# Patient Record
Sex: FEMALE | Race: BLACK OR AFRICAN AMERICAN | Employment: UNEMPLOYED | URBAN - METROPOLITAN AREA
[De-identification: names, ages, dates, MRNs, and addresses within clinical notes are randomized per-mention and may not be internally consistent; named-entity substitution may affect disease eponyms.]

---

## 2019-11-21 ENCOUNTER — HOSPITAL ENCOUNTER (EMERGENCY)
Age: 59
Discharge: HOME | End: 2019-11-21
Payer: COMMERCIAL

## 2019-11-21 VITALS
RESPIRATION RATE: 16 BRPM | DIASTOLIC BLOOD PRESSURE: 90 MMHG | SYSTOLIC BLOOD PRESSURE: 163 MMHG | TEMPERATURE: 98.4 F | HEIGHT: 65 IN | WEIGHT: 216.93 LBS | OXYGEN SATURATION: 99 % | HEART RATE: 91 BPM | BODY MASS INDEX: 36.14 KG/M2

## 2019-11-21 DIAGNOSIS — L29.9 EAR ITCHING: Primary | ICD-10-CM

## 2019-11-21 DIAGNOSIS — H61.21 EXCESSIVE CERUMEN IN EAR CANAL, RIGHT: ICD-10-CM

## 2019-11-21 PROCEDURE — 99282 EMERGENCY DEPT VISIT SF MDM: CPT

## 2019-11-22 ASSESSMENT — ENCOUNTER SYMPTOMS: RESPIRATORY NEGATIVE: 1

## 2020-06-23 ENCOUNTER — HOSPITAL ENCOUNTER (INPATIENT)
Age: 60
LOS: 2 days | Discharge: HOME | DRG: 174 | End: 2020-06-25
Attending: STUDENT IN AN ORGANIZED HEALTH CARE EDUCATION/TRAINING PROGRAM | Admitting: INTERNAL MEDICINE
Payer: COMMERCIAL

## 2020-06-23 ENCOUNTER — APPOINTMENT (OUTPATIENT)
Dept: GENERAL RADIOLOGY | Age: 60
DRG: 174 | End: 2020-06-23
Payer: COMMERCIAL

## 2020-06-23 PROBLEM — I21.29 ACUTE ST ELEVATION MYOCARDIAL INFARCTION (STEMI) OF POSTERIOR WALL (HCC): Status: ACTIVE | Noted: 2020-06-23

## 2020-06-23 LAB
A/G RATIO: 1.5 (ref 1.1–2.2)
ALBUMIN SERPL-MCNC: 4.3 G/DL (ref 3.4–5)
ALP BLD-CCNC: 100 U/L (ref 40–129)
ALT SERPL-CCNC: 16 U/L (ref 10–40)
ANION GAP SERPL CALCULATED.3IONS-SCNC: 16 MMOL/L (ref 3–16)
APTT: 34.5 SEC (ref 24.2–36.2)
AST SERPL-CCNC: 13 U/L (ref 15–37)
BASOPHILS ABSOLUTE: 0.1 K/UL (ref 0–0.2)
BASOPHILS RELATIVE PERCENT: 0.6 %
BILIRUB SERPL-MCNC: <0.2 MG/DL (ref 0–1)
BUN BLDV-MCNC: 12 MG/DL (ref 7–20)
CALCIUM SERPL-MCNC: 9.1 MG/DL (ref 8.3–10.6)
CHLORIDE BLD-SCNC: 100 MMOL/L (ref 99–110)
CO2: 24 MMOL/L (ref 21–32)
CREAT SERPL-MCNC: 0.6 MG/DL (ref 0.6–1.2)
D DIMER: <200 NG/ML DDU (ref 0–229)
EOSINOPHILS ABSOLUTE: 0.1 K/UL (ref 0–0.6)
EOSINOPHILS RELATIVE PERCENT: 0.7 %
GFR AFRICAN AMERICAN: >60
GFR NON-AFRICAN AMERICAN: >60
GLOBULIN: 2.9 G/DL
GLUCOSE BLD-MCNC: 155 MG/DL (ref 70–99)
HCT VFR BLD CALC: 43.6 % (ref 36–48)
HEMOGLOBIN: 14.5 G/DL (ref 12–16)
INR BLD: 0.94 (ref 0.86–1.14)
LACTIC ACID: 2.4 MMOL/L (ref 0.4–2)
LYMPHOCYTES ABSOLUTE: 3.3 K/UL (ref 1–5.1)
LYMPHOCYTES RELATIVE PERCENT: 26.9 %
MCH RBC QN AUTO: 28.9 PG (ref 26–34)
MCHC RBC AUTO-ENTMCNC: 33.2 G/DL (ref 31–36)
MCV RBC AUTO: 87.1 FL (ref 80–100)
MONOCYTES ABSOLUTE: 0.5 K/UL (ref 0–1.3)
MONOCYTES RELATIVE PERCENT: 4.1 %
NEUTROPHILS ABSOLUTE: 8.2 K/UL (ref 1.7–7.7)
NEUTROPHILS RELATIVE PERCENT: 67.7 %
PDW BLD-RTO: 14.3 % (ref 12.4–15.4)
PLATELET # BLD: 236 K/UL (ref 135–450)
PMV BLD AUTO: 8.2 FL (ref 5–10.5)
POTASSIUM REFLEX MAGNESIUM: 3.6 MMOL/L (ref 3.5–5.1)
PROCALCITONIN: 0.04 NG/ML (ref 0–0.15)
PROTHROMBIN TIME: 10.9 SEC (ref 10–13.2)
RBC # BLD: 5.01 M/UL (ref 4–5.2)
SARS-COV-2, NAAT: NOT DETECTED
SODIUM BLD-SCNC: 140 MMOL/L (ref 136–145)
TOTAL PROTEIN: 7.2 G/DL (ref 6.4–8.2)
TROPONIN: 0.05 NG/ML
TROPONIN: 0.1 NG/ML
WBC # BLD: 12.1 K/UL (ref 4–11)

## 2020-06-23 PROCEDURE — 71045 X-RAY EXAM CHEST 1 VIEW: CPT

## 2020-06-23 PROCEDURE — B2111ZZ FLUOROSCOPY OF MULTIPLE CORONARY ARTERIES USING LOW OSMOLAR CONTRAST: ICD-10-PCS | Performed by: INTERNAL MEDICINE

## 2020-06-23 PROCEDURE — 85730 THROMBOPLASTIN TIME PARTIAL: CPT

## 2020-06-23 PROCEDURE — 83605 ASSAY OF LACTIC ACID: CPT

## 2020-06-23 PROCEDURE — 6370000000 HC RX 637 (ALT 250 FOR IP): Performed by: INTERNAL MEDICINE

## 2020-06-23 PROCEDURE — 85610 PROTHROMBIN TIME: CPT

## 2020-06-23 PROCEDURE — 6370000000 HC RX 637 (ALT 250 FOR IP)

## 2020-06-23 PROCEDURE — U0002 COVID-19 LAB TEST NON-CDC: HCPCS

## 2020-06-23 PROCEDURE — B2151ZZ FLUOROSCOPY OF LEFT HEART USING LOW OSMOLAR CONTRAST: ICD-10-PCS | Performed by: INTERNAL MEDICINE

## 2020-06-23 PROCEDURE — 6370000000 HC RX 637 (ALT 250 FOR IP): Performed by: NURSE PRACTITIONER

## 2020-06-23 PROCEDURE — 2100000000 HC CCU R&B

## 2020-06-23 PROCEDURE — 85379 FIBRIN DEGRADATION QUANT: CPT

## 2020-06-23 PROCEDURE — 2580000003 HC RX 258: Performed by: INTERNAL MEDICINE

## 2020-06-23 PROCEDURE — 36415 COLL VENOUS BLD VENIPUNCTURE: CPT

## 2020-06-23 PROCEDURE — 93005 ELECTROCARDIOGRAM TRACING: CPT | Performed by: NURSE PRACTITIONER

## 2020-06-23 PROCEDURE — 027035Z DILATION OF CORONARY ARTERY, ONE ARTERY WITH TWO DRUG-ELUTING INTRALUMINAL DEVICES, PERCUTANEOUS APPROACH: ICD-10-PCS | Performed by: INTERNAL MEDICINE

## 2020-06-23 PROCEDURE — 99285 EMERGENCY DEPT VISIT HI MDM: CPT

## 2020-06-23 PROCEDURE — 93005 ELECTROCARDIOGRAM TRACING: CPT | Performed by: STUDENT IN AN ORGANIZED HEALTH CARE EDUCATION/TRAINING PROGRAM

## 2020-06-23 PROCEDURE — 96374 THER/PROPH/DIAG INJ IV PUSH: CPT

## 2020-06-23 PROCEDURE — 6360000002 HC RX W HCPCS: Performed by: INTERNAL MEDICINE

## 2020-06-23 PROCEDURE — 85025 COMPLETE CBC W/AUTO DIFF WBC: CPT

## 2020-06-23 PROCEDURE — 84145 PROCALCITONIN (PCT): CPT

## 2020-06-23 PROCEDURE — 4A023N7 MEASUREMENT OF CARDIAC SAMPLING AND PRESSURE, LEFT HEART, PERCUTANEOUS APPROACH: ICD-10-PCS | Performed by: INTERNAL MEDICINE

## 2020-06-23 PROCEDURE — 6370000000 HC RX 637 (ALT 250 FOR IP): Performed by: STUDENT IN AN ORGANIZED HEALTH CARE EDUCATION/TRAINING PROGRAM

## 2020-06-23 PROCEDURE — 80053 COMPREHEN METABOLIC PANEL: CPT

## 2020-06-23 PROCEDURE — 84484 ASSAY OF TROPONIN QUANT: CPT

## 2020-06-23 PROCEDURE — 6360000002 HC RX W HCPCS: Performed by: NURSE PRACTITIONER

## 2020-06-23 RX ORDER — ASPIRIN 81 MG/1
81 TABLET, CHEWABLE ORAL DAILY
Status: DISCONTINUED | OUTPATIENT
Start: 2020-06-24 | End: 2020-06-25 | Stop reason: HOSPADM

## 2020-06-23 RX ORDER — HEPARIN SODIUM 1000 [USP'U]/ML
30 INJECTION, SOLUTION INTRAVENOUS; SUBCUTANEOUS PRN
Status: DISCONTINUED | OUTPATIENT
Start: 2020-06-23 | End: 2020-06-23 | Stop reason: SDUPTHER

## 2020-06-23 RX ORDER — HEPARIN SODIUM 1000 [USP'U]/ML
60 INJECTION, SOLUTION INTRAVENOUS; SUBCUTANEOUS PRN
Status: DISCONTINUED | OUTPATIENT
Start: 2020-06-23 | End: 2020-06-23 | Stop reason: SDUPTHER

## 2020-06-23 RX ORDER — ASPIRIN 81 MG/1
81 TABLET, CHEWABLE ORAL DAILY
COMMUNITY

## 2020-06-23 RX ORDER — HEPARIN SODIUM 1000 [USP'U]/ML
4000 INJECTION, SOLUTION INTRAVENOUS; SUBCUTANEOUS ONCE
Status: COMPLETED | OUTPATIENT
Start: 2020-06-23 | End: 2020-06-23

## 2020-06-23 RX ORDER — ONDANSETRON 2 MG/ML
4 INJECTION INTRAMUSCULAR; INTRAVENOUS EVERY 6 HOURS PRN
Status: DISCONTINUED | OUTPATIENT
Start: 2020-06-23 | End: 2020-06-25 | Stop reason: HOSPADM

## 2020-06-23 RX ORDER — NITROGLYCERIN 0.4 MG/1
TABLET SUBLINGUAL
Status: COMPLETED
Start: 2020-06-23 | End: 2020-06-23

## 2020-06-23 RX ORDER — ATORVASTATIN CALCIUM 20 MG/1
20 TABLET, FILM COATED ORAL DAILY
COMMUNITY
End: 2020-07-07 | Stop reason: CLARIF

## 2020-06-23 RX ORDER — SODIUM CHLORIDE 0.9 % (FLUSH) 0.9 %
10 SYRINGE (ML) INJECTION EVERY 12 HOURS SCHEDULED
Status: DISCONTINUED | OUTPATIENT
Start: 2020-06-23 | End: 2020-06-24 | Stop reason: SDUPTHER

## 2020-06-23 RX ORDER — ASPIRIN 81 MG/1
324 TABLET, CHEWABLE ORAL ONCE
Status: DISCONTINUED | OUTPATIENT
Start: 2020-06-23 | End: 2020-06-23

## 2020-06-23 RX ORDER — ASCORBIC ACID 500 MG
500 TABLET ORAL DAILY
COMMUNITY
End: 2021-12-08

## 2020-06-23 RX ORDER — NICOTINE 21 MG/24HR
1 PATCH, TRANSDERMAL 24 HOURS TRANSDERMAL DAILY
Status: DISCONTINUED | OUTPATIENT
Start: 2020-06-24 | End: 2020-06-25 | Stop reason: HOSPADM

## 2020-06-23 RX ORDER — HEPARIN SODIUM 10000 [USP'U]/100ML
11.8 INJECTION, SOLUTION INTRAVENOUS CONTINUOUS
Status: DISCONTINUED | OUTPATIENT
Start: 2020-06-23 | End: 2020-06-24

## 2020-06-23 RX ORDER — ASPIRIN 81 MG/1
324 TABLET, CHEWABLE ORAL ONCE
Status: COMPLETED | OUTPATIENT
Start: 2020-06-23 | End: 2020-06-23

## 2020-06-23 RX ORDER — ACETAMINOPHEN 650 MG/1
650 SUPPOSITORY RECTAL EVERY 6 HOURS PRN
Status: DISCONTINUED | OUTPATIENT
Start: 2020-06-23 | End: 2020-06-24 | Stop reason: SDUPTHER

## 2020-06-23 RX ORDER — POTASSIUM CHLORIDE 20 MEQ/1
40 TABLET, EXTENDED RELEASE ORAL PRN
Status: DISCONTINUED | OUTPATIENT
Start: 2020-06-23 | End: 2020-06-25 | Stop reason: HOSPADM

## 2020-06-23 RX ORDER — POTASSIUM CHLORIDE 7.45 MG/ML
10 INJECTION INTRAVENOUS PRN
Status: DISCONTINUED | OUTPATIENT
Start: 2020-06-23 | End: 2020-06-25 | Stop reason: HOSPADM

## 2020-06-23 RX ORDER — ATORVASTATIN CALCIUM 40 MG/1
40 TABLET, FILM COATED ORAL NIGHTLY
Status: DISCONTINUED | OUTPATIENT
Start: 2020-06-23 | End: 2020-06-25 | Stop reason: HOSPADM

## 2020-06-23 RX ORDER — NITROGLYCERIN 0.4 MG/1
0.4 TABLET SUBLINGUAL EVERY 5 MIN PRN
Status: COMPLETED | OUTPATIENT
Start: 2020-06-23 | End: 2020-06-23

## 2020-06-23 RX ORDER — LOSARTAN POTASSIUM 100 MG/1
100 TABLET ORAL DAILY
COMMUNITY

## 2020-06-23 RX ORDER — M-VIT,TX,IRON,MINS/CALC/FOLIC 27MG-0.4MG
1 TABLET ORAL DAILY
COMMUNITY

## 2020-06-23 RX ORDER — FAMOTIDINE 20 MG/1
20 TABLET, FILM COATED ORAL 2 TIMES DAILY
Status: DISCONTINUED | OUTPATIENT
Start: 2020-06-23 | End: 2020-06-25 | Stop reason: HOSPADM

## 2020-06-23 RX ORDER — SODIUM CHLORIDE 0.9 % (FLUSH) 0.9 %
10 SYRINGE (ML) INJECTION PRN
Status: DISCONTINUED | OUTPATIENT
Start: 2020-06-23 | End: 2020-06-24 | Stop reason: SDUPTHER

## 2020-06-23 RX ORDER — ACETAMINOPHEN 325 MG/1
650 TABLET ORAL EVERY 6 HOURS PRN
Status: DISCONTINUED | OUTPATIENT
Start: 2020-06-23 | End: 2020-06-24 | Stop reason: SDUPTHER

## 2020-06-23 RX ORDER — POLYETHYLENE GLYCOL 3350 17 G/17G
17 POWDER, FOR SOLUTION ORAL DAILY PRN
Status: DISCONTINUED | OUTPATIENT
Start: 2020-06-23 | End: 2020-06-25 | Stop reason: HOSPADM

## 2020-06-23 RX ORDER — PROMETHAZINE HYDROCHLORIDE 25 MG/1
12.5 TABLET ORAL EVERY 6 HOURS PRN
Status: DISCONTINUED | OUTPATIENT
Start: 2020-06-23 | End: 2020-06-25 | Stop reason: HOSPADM

## 2020-06-23 RX ORDER — MAGNESIUM SULFATE IN WATER 40 MG/ML
2 INJECTION, SOLUTION INTRAVENOUS PRN
Status: DISCONTINUED | OUTPATIENT
Start: 2020-06-23 | End: 2020-06-25 | Stop reason: HOSPADM

## 2020-06-23 RX ORDER — NITROGLYCERIN 0.4 MG/1
0.4 TABLET SUBLINGUAL EVERY 5 MIN PRN
Status: DISCONTINUED | OUTPATIENT
Start: 2020-06-23 | End: 2020-06-23 | Stop reason: SDUPTHER

## 2020-06-23 RX ADMIN — NITROGLYCERIN 0.4 MG: 0.4 TABLET, ORALLY DISINTEGRATING SUBLINGUAL at 19:39

## 2020-06-23 RX ADMIN — NITROGLYCERIN 0.4 MG: 0.4 TABLET, ORALLY DISINTEGRATING SUBLINGUAL at 23:12

## 2020-06-23 RX ADMIN — HEPARIN SODIUM 4000 UNITS: 1000 INJECTION, SOLUTION INTRAVENOUS; SUBCUTANEOUS at 19:48

## 2020-06-23 RX ADMIN — Medication 10 ML: at 23:30

## 2020-06-23 RX ADMIN — HEPARIN SODIUM 10 ML/HR: 10000 INJECTION, SOLUTION INTRAVENOUS at 19:49

## 2020-06-23 RX ADMIN — Medication 10 ML: at 22:33

## 2020-06-23 RX ADMIN — ONDANSETRON 4 MG: 2 INJECTION INTRAMUSCULAR; INTRAVENOUS at 23:30

## 2020-06-23 RX ADMIN — ATORVASTATIN CALCIUM 40 MG: 40 TABLET, FILM COATED ORAL at 22:33

## 2020-06-23 RX ADMIN — NITROGLYCERIN 0.4 MG: 0.4 TABLET, ORALLY DISINTEGRATING SUBLINGUAL at 22:27

## 2020-06-23 RX ADMIN — NITROGLYCERIN 0.4 MG: 0.4 TABLET, ORALLY DISINTEGRATING SUBLINGUAL at 19:34

## 2020-06-23 RX ADMIN — NITROGLYCERIN 0.4 MG: 0.4 TABLET, ORALLY DISINTEGRATING SUBLINGUAL at 20:26

## 2020-06-23 RX ADMIN — FAMOTIDINE 20 MG: 20 TABLET ORAL at 22:33

## 2020-06-23 RX ADMIN — TICAGRELOR 180 MG: 90 TABLET ORAL at 20:21

## 2020-06-23 RX ADMIN — ASPIRIN 81 MG 324 MG: 81 TABLET ORAL at 19:33

## 2020-06-23 RX ADMIN — METOPROLOL TARTRATE 25 MG: 25 TABLET, FILM COATED ORAL at 20:21

## 2020-06-23 RX ADMIN — NITROGLYCERIN 0.4 MG: 0.4 TABLET, ORALLY DISINTEGRATING SUBLINGUAL at 21:33

## 2020-06-23 ASSESSMENT — PAIN SCALES - GENERAL
PAINLEVEL_OUTOF10: 0
PAINLEVEL_OUTOF10: 3
PAINLEVEL_OUTOF10: 5
PAINLEVEL_OUTOF10: 0
PAINLEVEL_OUTOF10: 1
PAINLEVEL_OUTOF10: 5
PAINLEVEL_OUTOF10: 0
PAINLEVEL_OUTOF10: 5
PAINLEVEL_OUTOF10: 10

## 2020-06-23 ASSESSMENT — ENCOUNTER SYMPTOMS
DIARRHEA: 0
SINUS PAIN: 0
TROUBLE SWALLOWING: 0
SINUS PRESSURE: 0
VOICE CHANGE: 0
FACIAL SWELLING: 0
RHINORRHEA: 0
SORE THROAT: 0
SHORTNESS OF BREATH: 1
ABDOMINAL DISTENTION: 0
NAUSEA: 0
ABDOMINAL PAIN: 0
CONSTIPATION: 0
COLOR CHANGE: 0
VOMITING: 0
COUGH: 1

## 2020-06-23 ASSESSMENT — PAIN DESCRIPTION - PAIN TYPE
TYPE: ACUTE PAIN
TYPE: CHRONIC PAIN
TYPE: ACUTE PAIN

## 2020-06-23 ASSESSMENT — PAIN DESCRIPTION - PROGRESSION: CLINICAL_PROGRESSION: NOT CHANGED

## 2020-06-23 ASSESSMENT — PAIN - FUNCTIONAL ASSESSMENT
PAIN_FUNCTIONAL_ASSESSMENT: PREVENTS OR INTERFERES SOME ACTIVE ACTIVITIES AND ADLS

## 2020-06-23 ASSESSMENT — PAIN DESCRIPTION - FREQUENCY
FREQUENCY: INTERMITTENT
FREQUENCY: INTERMITTENT

## 2020-06-23 ASSESSMENT — PAIN DESCRIPTION - LOCATION
LOCATION: CHEST
LOCATION: CHEST;BACK
LOCATION: CHEST

## 2020-06-23 ASSESSMENT — PAIN DESCRIPTION - ORIENTATION
ORIENTATION: LEFT

## 2020-06-23 ASSESSMENT — PAIN DESCRIPTION - DESCRIPTORS
DESCRIPTORS: PRESSURE
DESCRIPTORS: ACHING;PRESSURE
DESCRIPTORS: ACHING;PRESSURE
DESCRIPTORS: ACHING
DESCRIPTORS: TIGHTNESS;OTHER (COMMENT)

## 2020-06-23 ASSESSMENT — PAIN DESCRIPTION - ONSET: ONSET: ON-GOING

## 2020-06-23 NOTE — ED PROVIDER NOTES
fL    Neutrophils % 67.7 %    Lymphocytes % 26.9 %    Monocytes % 4.1 %    Eosinophils % 0.7 %    Basophils % 0.6 %    Neutrophils Absolute 8.2 (H) 1.7 - 7.7 K/uL    Lymphocytes Absolute 3.3 1.0 - 5.1 K/uL    Monocytes Absolute 0.5 0.0 - 1.3 K/uL    Eosinophils Absolute 0.1 0.0 - 0.6 K/uL    Basophils Absolute 0.1 0.0 - 0.2 K/uL   Comprehensive Metabolic Panel w/ Reflex to MG   Result Value Ref Range    Sodium 140 136 - 145 mmol/L    Potassium reflex Magnesium 3.6 3.5 - 5.1 mmol/L    Chloride 100 99 - 110 mmol/L    CO2 24 21 - 32 mmol/L    Anion Gap 16 3 - 16    Glucose 155 (H) 70 - 99 mg/dL    BUN 12 7 - 20 mg/dL    CREATININE 0.6 0.6 - 1.2 mg/dL    GFR Non-African American >60 >60    GFR African American >60 >60    Calcium 9.1 8.3 - 10.6 mg/dL    Total Protein 7.2 6.4 - 8.2 g/dL    Alb 4.3 3.4 - 5.0 g/dL    Albumin/Globulin Ratio 1.5 1.1 - 2.2    Total Bilirubin <0.2 0.0 - 1.0 mg/dL    Alkaline Phosphatase 100 40 - 129 U/L    ALT 16 10 - 40 U/L    AST 13 (L) 15 - 37 U/L    Globulin 2.9 g/dL   Procalcitonin   Result Value Ref Range    Procalcitonin 0.04 0.00 - 0.15 ng/mL   Troponin   Result Value Ref Range    Troponin 0.05 (H) <0.01 ng/mL   Lactic Acid, Plasma   Result Value Ref Range    Lactic Acid 2.4 (H) 0.4 - 2.0 mmol/L   Protime-INR   Result Value Ref Range    Protime 10.9 10.0 - 13.2 sec    INR 0.94 0.86 - 1.14   APTT   Result Value Ref Range    aPTT 34.5 24.2 - 36.2 sec   COVID-19   Result Value Ref Range    SARS-CoV-2, NAAT Not Detected Not Detected   Troponin   Result Value Ref Range    Troponin 0.10 (H) <0.01 ng/mL     Xr Chest Portable    Result Date: 6/23/2020  EXAMINATION: ONE XRAY VIEW OF THE CHEST 6/23/2020 6:54 pm COMPARISON: September 15, 2014 HISTORY: ORDERING SYSTEM PROVIDED HISTORY: SOB TECHNOLOGIST PROVIDED HISTORY: Reason for exam:->SOB Reason for Exam: Chest Pain (Pt stated having chest x 2 weeks along with back pain, and SOB. ) Acuity: Acute Type of Exam: Initial FINDINGS: The heart is not radiology section of CPT®: ordered and reviewed   Tests in the medicine section of CPT®: ordered and reviewed   Decide to obtain previous medical records or to obtain history from someone other than the patient: no  Obtain history from someone other than the patient: no  Review and summarize past medical records:yes  I looked up the patient in our electronic medical record:yes  Discuss the patient with other providers:yes  Independent visualization of images, tracings, or specimens:yes  Risk of Complications, Morbidity, and/or Mortality:Moderate  Presenting problems: moderate  Management options: moderate     Critical Care Attestation   Total critical care time: 35 minutes minimum   Critical care time does not include separately billable procedures and treating other patients. Critical care was necessary to treat or prevent imminent or life-threatening deterioration of the following conditions: STEMI, patient  treated urgently in the ED with anticoagulation pending cardiology recommendation. Case discussed with consultants. This record is transcribed utilizing voice recognition technology. There are inherent limitations in this technology. In addition, there may be limitations in editing of this report. If there are any discrepancies, please contact me directly.     Genna Stuart MD   6/24/2020         Nsehniitooh Jessica Jacobs MD  06/24/20 [id number]

## 2020-06-23 NOTE — ED PROVIDER NOTES
scleral icterus. Conjunctiva/sclera: Conjunctivae normal.   Neck:      Musculoskeletal: Full passive range of motion without pain and neck supple. No neck rigidity. Vascular: No JVD. Cardiovascular:      Rate and Rhythm: Regular rhythm. Tachycardia present. Heart sounds: No murmur. No friction rub. No gallop. Comments: EKG on arrival at 1833 showed slight ST depression in V3, V4, and V5. Shortly after arrival patient had worsening chest pain, and repeat EKG showed much worse ST depression in V2, V3, and V4, with flipped T waves in aVL. Cardiology was consulted and recommended nitro sublingual, heparin drip, aspirin, metoprolol, and Brilinta. Patient's chest pain resolved after 2 nitro, but she was still complaining of mild chest pressure. Pulmonary:      Effort: Pulmonary effort is normal. No respiratory distress. Breath sounds: Normal breath sounds. Abdominal:      General: There is no distension. Palpations: Abdomen is soft. Abdomen is not rigid. Tenderness: There is no abdominal tenderness. Musculoskeletal: Normal range of motion. Skin:     General: Skin is warm and dry. Capillary Refill: Capillary refill takes less than 2 seconds. Findings: No rash. Neurological:      General: No focal deficit present. Mental Status: She is alert and oriented to person, place, and time. Cranial Nerves: Cranial nerves are intact.    Psychiatric:         Mood and Affect: Mood normal.         MEDICAL DECISION MAKING    Vitals:    Vitals:    06/23/20 1930 06/23/20 1932 06/23/20 1938 06/23/20 2021   BP:  (!) 169/88 (!) 158/78 (!) 151/78   Pulse:  106 108 98   Resp:  27 24    Temp:       TempSrc:       SpO2:   100%    Weight: 195 lb 8.8 oz (88.7 kg)      Height: 5' 5\" (1.651 m)          LABS:  Labs Reviewed   CBC WITH AUTO DIFFERENTIAL - Abnormal; Notable for the following components:       Result Value    WBC 12.1 (*)     Neutrophils Absolute 8.2 (*)     All other components within normal limits    Narrative:     Performed at:  [facility]  [address] [place] [address]   Phone [phone]   COMPREHENSIVE METABOLIC PANEL W/ REFLEX TO MG FOR LOW K - Abnormal; Notable for the following components:    Glucose 155 (*)     AST 13 (*)     All other components within normal limits    Narrative:     Performed at:  [facility]  [address] [place] [address]   Phone [phone]   TROPONIN - Abnormal; Notable for the following components:    Troponin 0.05 (*)     All other components within normal limits    Narrative:     Performed at:  [facility]  [address] [place] [address]   Phone [phone]   LACTIC ACID, PLASMA - Abnormal; Notable for the following components:    Lactic Acid 2.4 (*)     All other components within normal limits    Narrative:     Performed at:  [facility]  [address] [place] [address]   Phone [phone]   D-DIMER, QUANTITATIVE    Narrative:     Performed at:  [facility]  [address] [place] [address]   Phone [phone]   PROCALCITONIN    Narrative:     Performed at:  [facility]  [address] [facility] [place] [address]   Phone [phone]   PROTIME-INR    Narrative:     Performed at:  [facility]  [address] [place] [address]   Phone [phone]   APTT    Narrative:     Performed at:  [facility]  [address] [facility] [place] [address]   Phone [phone]   URINE RT REFLEX TO CULTURE   APTT   COVID-19   COVID-19   COVID-19        Remainder of labs reviewed and werenegative at this time or not returned at the time of this note.     RADIOLOGY:   Non-plain film images such as CT, Ultrasound and MRI are read by the radiologist. NATY Sykes CNP have directly visualized the radiologic plain film image(s) with the below findings:        Interpretation per the Radiologist below, if available at the time of this note:    XR CHEST PORTABLE   Final Result   No acute cardiopulmonary process              No results found. MEDICAL DECISION MAKING / ED COURSE:      CRITICAL CARE NOTE:  There was a high probability of clinically significant life-threatening deterioration of the patient's condition requiring my urgent intervention. Total critical care time was at least 32 minutes. This includes vital sign monitoring, pulse oximetry monitoring, telemetry monitoring, clinical response to the IV medications, reviewing the nursing notes, consultation time, dictation/documentation time, and interpretation of the labwork. This excludes any separately billable procedures performed. PROCEDURES:   Procedures    None    Patient was given:  Medications   nitroGLYCERIN (NITROSTAT) SL tablet 0.4 mg (0.4 mg Sublingual Given 6/23/20 1939)   heparin 25,000 units in dextrose 5% 250 mL infusion (10 mL/hr Intravenous New Bag 6/23/20 1949)   aspirin chewable tablet 324 mg (324 mg Oral Given 6/23/20 1933)   heparin (porcine) injection 4,000 Units (4,000 Units Intravenous Given 6/23/20 1948)   metoprolol tartrate (LOPRESSOR) tablet 25 mg (25 mg Oral Given 6/23/20 2021)   ticagrelor (BRILINTA) tablet 180 mg (180 mg Oral Given 6/23/20 2021)       Differential Diagnosis: Acute Coronary Syndrome, Congestive Heart Failure, Thoracic Dissection, Pericarditis, Pericardial Effusion, Pulmonary Embolism, Pneumonia, Pneumothorax, Ischemic Bowel, Bowel Obstruction, PUD, GERD, Acute Cholecystitis, Pancreatitis, Hepatitis, Colitis, other    Patient presents with chest pain, shortness of breath, fatigue, and body aches. See HPI for full presentation. Physical exam as above.   Well-appearing pleasant 69-year-old female lying in bed

## 2020-06-23 NOTE — ED NOTES
Chest pain was rated 10 prior to nitro admin, after 1st nitro pt rates pain a 7/10, 2nd to be administered.      Jose Juan Schaefer RN  06/23/20 9226

## 2020-06-24 ENCOUNTER — APPOINTMENT (OUTPATIENT)
Dept: CARDIAC CATH/INVASIVE PROCEDURES | Age: 60
DRG: 174 | End: 2020-06-24
Payer: COMMERCIAL

## 2020-06-24 PROBLEM — F17.200 TOBACCO USE DISORDER: Status: ACTIVE | Noted: 2020-06-24

## 2020-06-24 PROBLEM — E11.9 DM (DIABETES MELLITUS) (HCC): Status: ACTIVE | Noted: 2020-06-24

## 2020-06-24 LAB
ANION GAP SERPL CALCULATED.3IONS-SCNC: 16 MMOL/L (ref 3–16)
APTT: 33.1 SEC (ref 24.2–36.2)
APTT: 48.2 SEC (ref 24.2–36.2)
APTT: 61.5 SEC (ref 24.2–36.2)
BILIRUBIN URINE: NEGATIVE
BLOOD, URINE: NEGATIVE
BUN BLDV-MCNC: 8 MG/DL (ref 7–20)
CALCIUM SERPL-MCNC: 8.9 MG/DL (ref 8.3–10.6)
CHLORIDE BLD-SCNC: 99 MMOL/L (ref 99–110)
CHOLESTEROL, TOTAL: 214 MG/DL (ref 0–199)
CLARITY: CLEAR
CO2: 24 MMOL/L (ref 21–32)
COLOR: YELLOW
CREAT SERPL-MCNC: 0.5 MG/DL (ref 0.6–1.2)
EKG ATRIAL RATE: 58 BPM
EKG ATRIAL RATE: 64 BPM
EKG ATRIAL RATE: 96 BPM
EKG ATRIAL RATE: 96 BPM
EKG ATRIAL RATE: 97 BPM
EKG DIAGNOSIS: NORMAL
EKG P AXIS: 38 DEGREES
EKG P AXIS: 40 DEGREES
EKG P AXIS: 44 DEGREES
EKG P AXIS: 50 DEGREES
EKG P AXIS: 50 DEGREES
EKG P-R INTERVAL: 124 MS
EKG P-R INTERVAL: 138 MS
EKG P-R INTERVAL: 152 MS
EKG P-R INTERVAL: 156 MS
EKG P-R INTERVAL: 184 MS
EKG Q-T INTERVAL: 342 MS
EKG Q-T INTERVAL: 342 MS
EKG Q-T INTERVAL: 358 MS
EKG Q-T INTERVAL: 436 MS
EKG Q-T INTERVAL: 456 MS
EKG QRS DURATION: 68 MS
EKG QRS DURATION: 70 MS
EKG QRS DURATION: 70 MS
EKG QRS DURATION: 72 MS
EKG QRS DURATION: 76 MS
EKG QTC CALCULATION (BAZETT): 432 MS
EKG QTC CALCULATION (BAZETT): 434 MS
EKG QTC CALCULATION (BAZETT): 447 MS
EKG QTC CALCULATION (BAZETT): 449 MS
EKG QTC CALCULATION (BAZETT): 452 MS
EKG R AXIS: -1 DEGREES
EKG R AXIS: 14 DEGREES
EKG R AXIS: 2 DEGREES
EKG R AXIS: 3 DEGREES
EKG R AXIS: 9 DEGREES
EKG T AXIS: 12 DEGREES
EKG T AXIS: 33 DEGREES
EKG T AXIS: 66 DEGREES
EKG T AXIS: 84 DEGREES
EKG T AXIS: 96 DEGREES
EKG VENTRICULAR RATE: 58 BPM
EKG VENTRICULAR RATE: 64 BPM
EKG VENTRICULAR RATE: 96 BPM
EKG VENTRICULAR RATE: 96 BPM
EKG VENTRICULAR RATE: 97 BPM
ESTIMATED AVERAGE GLUCOSE: 114 MG/DL
GFR AFRICAN AMERICAN: >60
GFR NON-AFRICAN AMERICAN: >60
GLUCOSE BLD-MCNC: 135 MG/DL (ref 70–99)
GLUCOSE BLD-MCNC: 161 MG/DL (ref 70–99)
GLUCOSE BLD-MCNC: 174 MG/DL (ref 70–99)
GLUCOSE BLD-MCNC: 185 MG/DL (ref 70–99)
GLUCOSE URINE: NEGATIVE MG/DL
HBA1C MFR BLD: 5.6 %
HCT VFR BLD CALC: 44.4 % (ref 36–48)
HDLC SERPL-MCNC: 47 MG/DL (ref 40–60)
HEMOGLOBIN: 14.8 G/DL (ref 12–16)
KETONES, URINE: NEGATIVE MG/DL
LDL CHOLESTEROL CALCULATED: 128 MG/DL
LEUKOCYTE ESTERASE, URINE: NEGATIVE
MAGNESIUM: 1.7 MG/DL (ref 1.8–2.4)
MCH RBC QN AUTO: 29 PG (ref 26–34)
MCHC RBC AUTO-ENTMCNC: 33.3 G/DL (ref 31–36)
MCV RBC AUTO: 86.9 FL (ref 80–100)
MICROSCOPIC EXAMINATION: NORMAL
NITRITE, URINE: NEGATIVE
PDW BLD-RTO: 14.6 % (ref 12.4–15.4)
PERFORMED ON: ABNORMAL
PH UA: 7.5 (ref 5–8)
PLATELET # BLD: 274 K/UL (ref 135–450)
PMV BLD AUTO: 8.3 FL (ref 5–10.5)
POC ACT LR: 241 SEC
POC ACT LR: >400 SEC
POTASSIUM REFLEX MAGNESIUM: 3.5 MMOL/L (ref 3.5–5.1)
PROTEIN UA: NEGATIVE MG/DL
RBC # BLD: 5.11 M/UL (ref 4–5.2)
SODIUM BLD-SCNC: 139 MMOL/L (ref 136–145)
SPECIFIC GRAVITY UA: 1.01 (ref 1–1.03)
TRIGL SERPL-MCNC: 195 MG/DL (ref 0–150)
TROPONIN: 0.18 NG/ML
URINE REFLEX TO CULTURE: NORMAL
URINE TYPE: NORMAL
UROBILINOGEN, URINE: 0.2 E.U./DL
VLDLC SERPL CALC-MCNC: 39 MG/DL
WBC # BLD: 11 K/UL (ref 4–11)

## 2020-06-24 PROCEDURE — 2140000000 HC CCU INTERMEDIATE R&B

## 2020-06-24 PROCEDURE — C1894 INTRO/SHEATH, NON-LASER: HCPCS

## 2020-06-24 PROCEDURE — C1769 GUIDE WIRE: HCPCS

## 2020-06-24 PROCEDURE — 94761 N-INVAS EAR/PLS OXIMETRY MLT: CPT

## 2020-06-24 PROCEDURE — 81003 URINALYSIS AUTO W/O SCOPE: CPT

## 2020-06-24 PROCEDURE — 93458 L HRT ARTERY/VENTRICLE ANGIO: CPT | Performed by: INTERNAL MEDICINE

## 2020-06-24 PROCEDURE — 6370000000 HC RX 637 (ALT 250 FOR IP): Performed by: INTERNAL MEDICINE

## 2020-06-24 PROCEDURE — 6360000004 HC RX CONTRAST MEDICATION: Performed by: FAMILY MEDICINE

## 2020-06-24 PROCEDURE — 93010 ELECTROCARDIOGRAM REPORT: CPT | Performed by: INTERNAL MEDICINE

## 2020-06-24 PROCEDURE — 93005 ELECTROCARDIOGRAM TRACING: CPT | Performed by: INTERNAL MEDICINE

## 2020-06-24 PROCEDURE — 99153 MOD SED SAME PHYS/QHP EA: CPT

## 2020-06-24 PROCEDURE — 6370000000 HC RX 637 (ALT 250 FOR IP): Performed by: NURSE PRACTITIONER

## 2020-06-24 PROCEDURE — 2700000000 HC OXYGEN THERAPY PER DAY

## 2020-06-24 PROCEDURE — 2709999900 HC NON-CHARGEABLE SUPPLY

## 2020-06-24 PROCEDURE — 84484 ASSAY OF TROPONIN QUANT: CPT

## 2020-06-24 PROCEDURE — 85730 THROMBOPLASTIN TIME PARTIAL: CPT

## 2020-06-24 PROCEDURE — C1887 CATHETER, GUIDING: HCPCS

## 2020-06-24 PROCEDURE — 99152 MOD SED SAME PHYS/QHP 5/>YRS: CPT

## 2020-06-24 PROCEDURE — 83036 HEMOGLOBIN GLYCOSYLATED A1C: CPT

## 2020-06-24 PROCEDURE — 85027 COMPLETE CBC AUTOMATED: CPT

## 2020-06-24 PROCEDURE — 80061 LIPID PANEL: CPT

## 2020-06-24 PROCEDURE — 36415 COLL VENOUS BLD VENIPUNCTURE: CPT

## 2020-06-24 PROCEDURE — 92928 PRQ TCAT PLMT NTRAC ST 1 LES: CPT | Performed by: INTERNAL MEDICINE

## 2020-06-24 PROCEDURE — 6360000002 HC RX W HCPCS

## 2020-06-24 PROCEDURE — 85347 COAGULATION TIME ACTIVATED: CPT

## 2020-06-24 PROCEDURE — 93458 L HRT ARTERY/VENTRICLE ANGIO: CPT

## 2020-06-24 PROCEDURE — 2580000003 HC RX 258: Performed by: INTERNAL MEDICINE

## 2020-06-24 PROCEDURE — 80048 BASIC METABOLIC PNL TOTAL CA: CPT

## 2020-06-24 PROCEDURE — 2500000003 HC RX 250 WO HCPCS

## 2020-06-24 PROCEDURE — 6370000000 HC RX 637 (ALT 250 FOR IP): Performed by: FAMILY MEDICINE

## 2020-06-24 PROCEDURE — 92928 PRQ TCAT PLMT NTRAC ST 1 LES: CPT

## 2020-06-24 PROCEDURE — 83735 ASSAY OF MAGNESIUM: CPT

## 2020-06-24 PROCEDURE — C1874 STENT, COATED/COV W/DEL SYS: HCPCS

## 2020-06-24 PROCEDURE — 99255 IP/OBS CONSLTJ NEW/EST HI 80: CPT | Performed by: INTERNAL MEDICINE

## 2020-06-24 PROCEDURE — C1725 CATH, TRANSLUMIN NON-LASER: HCPCS

## 2020-06-24 RX ORDER — TRAZODONE HYDROCHLORIDE 50 MG/1
50 TABLET ORAL NIGHTLY
Status: DISCONTINUED | OUTPATIENT
Start: 2020-06-24 | End: 2020-06-25 | Stop reason: HOSPADM

## 2020-06-24 RX ORDER — AMITRIPTYLINE HYDROCHLORIDE 10 MG/1
10 TABLET, FILM COATED ORAL NIGHTLY
Status: DISCONTINUED | OUTPATIENT
Start: 2020-06-24 | End: 2020-06-24

## 2020-06-24 RX ORDER — ONDANSETRON 2 MG/ML
4 INJECTION INTRAMUSCULAR; INTRAVENOUS EVERY 6 HOURS PRN
Status: DISCONTINUED | OUTPATIENT
Start: 2020-06-24 | End: 2020-06-24 | Stop reason: SDUPTHER

## 2020-06-24 RX ORDER — ATROPINE SULFATE 0.4 MG/ML
0.5 AMPUL (ML) INJECTION
Status: ACTIVE | OUTPATIENT
Start: 2020-06-24 | End: 2020-06-24

## 2020-06-24 RX ORDER — INSULIN GLARGINE 100 [IU]/ML
15 INJECTION, SOLUTION SUBCUTANEOUS ONCE
Status: DISCONTINUED | OUTPATIENT
Start: 2020-06-24 | End: 2020-06-24

## 2020-06-24 RX ORDER — DEXTROSE MONOHYDRATE 25 G/50ML
12.5 INJECTION, SOLUTION INTRAVENOUS PRN
Status: DISCONTINUED | OUTPATIENT
Start: 2020-06-24 | End: 2020-06-25 | Stop reason: HOSPADM

## 2020-06-24 RX ORDER — SODIUM CHLORIDE 0.9 % (FLUSH) 0.9 %
10 SYRINGE (ML) INJECTION PRN
Status: DISCONTINUED | OUTPATIENT
Start: 2020-06-24 | End: 2020-06-25 | Stop reason: HOSPADM

## 2020-06-24 RX ORDER — GABAPENTIN 300 MG/1
300 CAPSULE ORAL PRN
COMMUNITY

## 2020-06-24 RX ORDER — HEPARIN SODIUM 1000 [USP'U]/ML
2000 INJECTION, SOLUTION INTRAVENOUS; SUBCUTANEOUS ONCE
Status: DISCONTINUED | OUTPATIENT
Start: 2020-06-24 | End: 2020-06-24

## 2020-06-24 RX ORDER — SODIUM CHLORIDE 0.9 % (FLUSH) 0.9 %
10 SYRINGE (ML) INJECTION EVERY 12 HOURS SCHEDULED
Status: DISCONTINUED | OUTPATIENT
Start: 2020-06-24 | End: 2020-06-25 | Stop reason: HOSPADM

## 2020-06-24 RX ORDER — 0.9 % SODIUM CHLORIDE 0.9 %
250 INTRAVENOUS SOLUTION INTRAVENOUS PRN
Status: DISCONTINUED | OUTPATIENT
Start: 2020-06-24 | End: 2020-06-25 | Stop reason: HOSPADM

## 2020-06-24 RX ORDER — NITROGLYCERIN 0.4 MG/1
0.4 TABLET SUBLINGUAL EVERY 5 MIN PRN
Status: DISCONTINUED | OUTPATIENT
Start: 2020-06-24 | End: 2020-06-25 | Stop reason: HOSPADM

## 2020-06-24 RX ORDER — LOSARTAN POTASSIUM 25 MG/1
25 TABLET ORAL DAILY
Status: DISCONTINUED | OUTPATIENT
Start: 2020-06-24 | End: 2020-06-24

## 2020-06-24 RX ORDER — MORPHINE SULFATE 2 MG/ML
2 INJECTION, SOLUTION INTRAMUSCULAR; INTRAVENOUS
Status: ACTIVE | OUTPATIENT
Start: 2020-06-24 | End: 2020-06-24

## 2020-06-24 RX ORDER — ACETAMINOPHEN 325 MG/1
650 TABLET ORAL EVERY 4 HOURS PRN
Status: DISCONTINUED | OUTPATIENT
Start: 2020-06-24 | End: 2020-06-25 | Stop reason: HOSPADM

## 2020-06-24 RX ORDER — NICOTINE POLACRILEX 4 MG
15 LOZENGE BUCCAL PRN
Status: DISCONTINUED | OUTPATIENT
Start: 2020-06-24 | End: 2020-06-25 | Stop reason: HOSPADM

## 2020-06-24 RX ORDER — HEPARIN SODIUM 1000 [USP'U]/ML
4000 INJECTION, SOLUTION INTRAVENOUS; SUBCUTANEOUS ONCE
Status: DISCONTINUED | OUTPATIENT
Start: 2020-06-24 | End: 2020-06-24

## 2020-06-24 RX ORDER — LOSARTAN POTASSIUM 50 MG/1
100 TABLET ORAL DAILY
Status: DISCONTINUED | OUTPATIENT
Start: 2020-06-24 | End: 2020-06-25 | Stop reason: HOSPADM

## 2020-06-24 RX ORDER — DEXTROSE MONOHYDRATE 50 MG/ML
100 INJECTION, SOLUTION INTRAVENOUS PRN
Status: DISCONTINUED | OUTPATIENT
Start: 2020-06-24 | End: 2020-06-25 | Stop reason: HOSPADM

## 2020-06-24 RX ADMIN — FAMOTIDINE 20 MG: 20 TABLET ORAL at 08:35

## 2020-06-24 RX ADMIN — TICAGRELOR 90 MG: 90 TABLET ORAL at 21:05

## 2020-06-24 RX ADMIN — Medication 10 ML: at 08:36

## 2020-06-24 RX ADMIN — METOPROLOL TARTRATE 12.5 MG: 25 TABLET, FILM COATED ORAL at 21:03

## 2020-06-24 RX ADMIN — NITROGLYCERIN 0.4 MG: 0.4 TABLET, ORALLY DISINTEGRATING SUBLINGUAL at 05:02

## 2020-06-24 RX ADMIN — METOPROLOL TARTRATE 25 MG: 25 TABLET, FILM COATED ORAL at 08:35

## 2020-06-24 RX ADMIN — INSULIN LISPRO 2 UNITS: 100 INJECTION, SOLUTION INTRAVENOUS; SUBCUTANEOUS at 12:12

## 2020-06-24 RX ADMIN — INSULIN LISPRO 1 UNITS: 100 INJECTION, SOLUTION INTRAVENOUS; SUBCUTANEOUS at 21:03

## 2020-06-24 RX ADMIN — TRAZODONE HYDROCHLORIDE 50 MG: 50 TABLET ORAL at 21:03

## 2020-06-24 RX ADMIN — POTASSIUM CHLORIDE 40 MEQ: 1500 TABLET, EXTENDED RELEASE ORAL at 05:19

## 2020-06-24 RX ADMIN — LOSARTAN POTASSIUM 100 MG: 50 TABLET, FILM COATED ORAL at 08:35

## 2020-06-24 RX ADMIN — FAMOTIDINE 20 MG: 20 TABLET ORAL at 21:06

## 2020-06-24 RX ADMIN — ACETAMINOPHEN 650 MG: 325 TABLET, FILM COATED ORAL at 10:50

## 2020-06-24 RX ADMIN — ASPIRIN 81 MG 81 MG: 81 TABLET ORAL at 08:35

## 2020-06-24 RX ADMIN — ATORVASTATIN CALCIUM 40 MG: 40 TABLET, FILM COATED ORAL at 21:06

## 2020-06-24 RX ADMIN — TICAGRELOR 90 MG: 90 TABLET ORAL at 08:35

## 2020-06-24 RX ADMIN — IOPAMIDOL 140 ML: 755 INJECTION, SOLUTION INTRAVENOUS at 10:44

## 2020-06-24 RX ADMIN — ACETAMINOPHEN 650 MG: 325 TABLET, FILM COATED ORAL at 17:11

## 2020-06-24 ASSESSMENT — PAIN DESCRIPTION - FREQUENCY
FREQUENCY: INTERMITTENT
FREQUENCY: INTERMITTENT

## 2020-06-24 ASSESSMENT — PAIN - FUNCTIONAL ASSESSMENT
PAIN_FUNCTIONAL_ASSESSMENT: PREVENTS OR INTERFERES SOME ACTIVE ACTIVITIES AND ADLS
PAIN_FUNCTIONAL_ASSESSMENT: ACTIVITIES ARE NOT PREVENTED
PAIN_FUNCTIONAL_ASSESSMENT: PREVENTS OR INTERFERES SOME ACTIVE ACTIVITIES AND ADLS
PAIN_FUNCTIONAL_ASSESSMENT: PREVENTS OR INTERFERES SOME ACTIVE ACTIVITIES AND ADLS

## 2020-06-24 ASSESSMENT — PAIN DESCRIPTION - ORIENTATION
ORIENTATION: LEFT
ORIENTATION: MID;LEFT
ORIENTATION: LEFT
ORIENTATION: MID;LEFT

## 2020-06-24 ASSESSMENT — PAIN DESCRIPTION - DESCRIPTORS
DESCRIPTORS: ACHING
DESCRIPTORS: ACHING
DESCRIPTORS: TIGHTNESS
DESCRIPTORS: TIGHTNESS

## 2020-06-24 ASSESSMENT — PAIN SCALES - GENERAL
PAINLEVEL_OUTOF10: 0
PAINLEVEL_OUTOF10: 2
PAINLEVEL_OUTOF10: 2
PAINLEVEL_OUTOF10: 0
PAINLEVEL_OUTOF10: 0
PAINLEVEL_OUTOF10: 5
PAINLEVEL_OUTOF10: 0
PAINLEVEL_OUTOF10: 0
PAINLEVEL_OUTOF10: 5

## 2020-06-24 ASSESSMENT — PAIN DESCRIPTION - LOCATION
LOCATION: CHEST

## 2020-06-24 ASSESSMENT — PAIN DESCRIPTION - PAIN TYPE
TYPE: ACUTE PAIN

## 2020-06-24 ASSESSMENT — PAIN DESCRIPTION - ONSET
ONSET: ON-GOING
ONSET: ON-GOING

## 2020-06-24 ASSESSMENT — PAIN DESCRIPTION - PROGRESSION
CLINICAL_PROGRESSION: RAPIDLY IMPROVING
CLINICAL_PROGRESSION: RAPIDLY IMPROVING

## 2020-06-24 NOTE — ANESTHESIA PRE-OP
Brief Pre-Op Note/Sedation Assessment      Michael Maribell  1960  [id number]      [id number]  9:17 AM    Planned Procedure: Cardiac Catheterization Procedure    Post Procedure Plan: Return to same level of care    Consent: I have discussed with the patient and/or the patient representative the indication, alternatives, and the possible risks and/or complications of the planned procedure and the anesthesia methods. The patient and/or patient representative appear to understand and agree to proceed. Chief Complaint: NSTEMI      Indications for Cath Procedure:  ACS <= 24 hrs  Anginal Classification within 2 weeks:  CCS IV - Inability to perform any activity without angina or angina at rest, i.e., severe limitation  NYHA Heart Failure Class within 2 weeks: No symptoms  Is Cath Lab Visit Valve-related?: No  Surgical Risk: N/A  Functional Type: N/A    Anti- Anginal Meds within 2 weeks:   Yes: Beta Blockers, Aspirin and Statin (Any)    Stress or Imaging Studies Performed:  None     Vital Signs:  /70   Pulse 66   Temp 98.4 °F (36.9 °C) (Oral)   Resp 24   Ht 5' 5\" (1.651 m)   Wt 182 lb 8.7 oz (82.8 kg)   LMP 02/09/2015 Comment: spotting, haven't had periods approx 1 year  SpO2 100%   BMI 30.38 kg/m²     Allergies:   Allergies   Allergen Reactions    Percocet [Oxycodone-Acetaminophen] Nausea Only       Past Medical History:  Past Medical History:   Diagnosis Date    Allergic rhinitis     Hyperlipidemia     Hypertension     Neuropathy     Type II or unspecified type diabetes mellitus without mention of complication, not stated as uncontrolled          Surgical History:  Past Surgical History:   Procedure Laterality Date    ANTERIOR CRUCIATE LIGAMENT REPAIR      TUBAL LIGATION           Medications:  Current Facility-Administered Medications   Medication Dose Route Frequency Provider Last Rate Last Dose    nitroGLYCERIN (NITROSTAT) SL tablet 0.4 mg  0.4 mg Sublingual Q5 Min PRN Senai MD Kamla   0.4 mg at 06/24/20 0502    glucose (GLUTOSE) 40 % oral gel 15 g  15 g Oral PRN Abbie Dowling MD        dextrose 50 % IV solution  12.5 g Intravenous PRN Abbie Dowling MD        glucagon (rDNA) injection 1 mg  1 mg Intramuscular PRN Abbie Dowling MD        dextrose 5 % solution  100 mL/hr Intravenous PRN Abbie Dowling MD        insulin lispro (HUMALOG) injection vial 0-12 Units  0-12 Units Subcutaneous TID WC Abbie Dowling MD        insulin lispro (HUMALOG) injection vial 0-6 Units  0-6 Units Subcutaneous Nightly Abbie Dowling MD        losartan (COZAAR) tablet 100 mg  100 mg Oral Daily NATY Trinh CNP   100 mg at 06/24/20 0835    heparin 25,000 units in dextrose 5% 250 mL infusion  10 mL/hr Intravenous Continuous Gilford Medley, APRN - CNP 10 mL/hr at 06/23/20 2126 10 mL/hr at 06/23/20 2126    sodium chloride flush 0.9 % injection 10 mL  10 mL Intravenous 2 times per day Abbie Dowling MD   10 mL at 06/24/20 0836    sodium chloride flush 0.9 % injection 10 mL  10 mL Intravenous PRN Abbie Dowling MD   10 mL at 06/23/20 2330    acetaminophen (TYLENOL) tablet 650 mg  650 mg Oral Q6H PRN Abbie Dowling MD        Or   Roro Nepali acetaminophen (TYLENOL) suppository 650 mg  650 mg Rectal Q6H PRN Abbie Dowling MD        polyethylene glycol (GLYCOLAX) packet 17 g  17 g Oral Daily PRN Abbie Dowling MD        promethazine (PHENERGAN) tablet 12.5 mg  12.5 mg Oral Q6H PRN Abbie Dowling MD        Or    ondansetron (ZOFRAN) injection 4 mg  4 mg Intravenous Q6H PRN Abbie Dowling MD   4 mg at 06/23/20 2330    atorvastatin (LIPITOR) tablet 40 mg  40 mg Oral Nightly Abbie Dowling MD   40 mg at 06/23/20 2233    aspirin chewable tablet 81 mg  81 mg Oral Daily Abbie Dowling MD   81 mg at 06/24/20 0835    nicotine (NICODERM CQ) 14 MG/24HR 1 patch  1 patch Transdermal Daily Abbie Dowling MD        metoprolol tartrate (LOPRESSOR) tablet 25 mg  25 mg Oral BID Abbie Dowling MD   25 mg at

## 2020-06-24 NOTE — PROGRESS NOTES
Nutrition Assessment (Low Risk)    Type and Reason for Visit: Initial, Consult, Patient Education    Nutrition Recommendations:   Encouraged pt to contact Dietitian should any questions arise regarding diet     Nutrition Assessment:   Pt with pmh of DM, HTN, HLD, adm with chest pain. Found to have suffered an MI & is s/p heart cath with stenting. Diet adv to cardiac. Pt with good BS control AEB A1C taken this date of 5.6. Pt will observe diet for DM as well on own per feedback. Pt reported good po. Feedback revealed that pt has been eating healthier. Recall revealed healthier choices. Discussed heart healthy eating with pt & daughter. Comprehended per feedback. Very motivated to incorporate a lower sodium regimen to be even more heart smart. Time 15 min. Patient assessed for nutritional risk. Deemed to be at low risk at this time. Will continue to monitor for changes in status.   Ht: 5'5\"  Wt: 6/24 182# 8.7 oz    Malnutrition Assessment:  · Malnutrition Status: No malnutrition    Nutrition Risk Level   Risk Level: Low    Nutrition Diagnosis:   · Problem: Food and nutrition-related knowledge deficit  · Etiology: Cardiac dysfunction    Signs and symptoms: Patient report of    Nutrition Intervention:  Food and/or Delivery: Continue current diet  Nutrition Education/Counseling/Coordination of Care:  Continued Inpatient Monitoring, Education Completed      Electronically signed by Butch Basilio RD, LD on 6/24/20 at 4:22 PM EDT    Contact Number: [phone]

## 2020-06-24 NOTE — H&P
Hospital Medicine History & Physical      PCP: Alivia Worthington MD    Date of Admission: 6/23/2020    Date of Service: Pt seen/examined on 06/23/20 and Admitted to Inpatient with expected LOS greater than two midnights due to medical therapy. Chief Complaint:  Chest pain       History Of Present Illness:  Salima Chan is 56 y.o. female who presented with complaint of chest pain. Symptom onset was acute for a time period of 8 days. The severity is described as severe. The course of his symptoms over time is intermittent. The symptoms improved with rest and worsened with exertion. The patient's symptom is associated with SOB, body ache and malaise. Salima Chan is 56 y.o. female with history of HTN, HLP, DM II, tobacco use  She presents to the ER yesterday with complaint of chest pain, SOB, fever, body ache and not feeling well. She says her symptoms started 8 days ago. She becomes diaphoretic and clammy intermittently   Chest pain is present both at rest (while sleeping) as well as during exertion. EKG shows posterior STEMI and trop is elevated. She is started on heparin gtt per cardiology and admitted to the ICU. Past Medical History:          Diagnosis Date    Allergic rhinitis     Hyperlipidemia     Hypertension     Neuropathy     Type II or unspecified type diabetes mellitus without mention of complication, not stated as uncontrolled        Past Surgical History:          Procedure Laterality Date    ANTERIOR CRUCIATE LIGAMENT REPAIR      TUBAL LIGATION         Medications Prior to Admission:      Prior to Admission medications    Medication Sig Start Date End Date Taking? Authorizing Provider   HYDROcodone-acetaminophen (LORTAB) 5-325 MG per tablet Take 1 tablet by mouth. 1/23/15   Historical Provider, MD   naproxen (NAPROSYN) 500 MG tablet Take 1 tablet by mouth 2 times daily (with meals).  2/26/15   JAROD Cardona   pravastatin (PRAVACHOL) 20 MG tablet Take 20 mg by mouth 2 times daily. Historical Provider, MD   amitriptyline (ELAVIL) 10 MG tablet Take 10 mg by mouth nightly. Historical Provider, MD   amLODIPine (NORVASC) 10 MG tablet Take 1 tablet by mouth daily. 2/13/15   Simona Hickman MD   metFORMIN (GLUCOPHAGE) 500 MG tablet Take 1 tablet by mouth 2 times daily (with meals). 2/13/15   Simona Hickman MD   metoprolol (TOPROL-XL) 50 MG XL tablet Take 50 mg by mouth 2 times daily. Historical Provider, MD       Allergies:  Percocet [oxycodone-acetaminophen]    Social History:      The patient currently lives at home    TOBACCO:   reports that she has been smoking. She has a 1.25 pack-year smoking history. She has never used smokeless tobacco.  ETOH:   reports current alcohol use. E-Cigarettes Vaping or Juuling     Questions Responses    Vaping Use     Start Date     Does device contain nicotine? Quit Date     Vaping Type             Family History:       Reviewed in detail and negative for DM, CAD, Cancer, CVA. Positive as follows:        Problem Relation Age of Onset    Arthritis Mother     High Blood Pressure Maternal Grandmother        REVIEW OF SYSTEMS:   Pertinent positives as noted in the HPI. All other systems reviewed and negative. PHYSICAL EXAM PERFORMED:    /77   Pulse 85   Temp 98.1 °F (36.7 °C) (Axillary)   Resp 28   Ht 5' 5\" (1.651 m)   Wt 185 lb 3 oz (84 kg)   LMP 02/09/2015 Comment: spotting, haven't had periods approx 1 year  SpO2 100%   BMI 30.82 kg/m²     General appearance:  No apparent distress, appears stated age and cooperative. HEENT:  Normal cephalic, atraumatic without obvious deformity. Pupils equal, round, and reactive to light. Extra ocular muscles intact. Conjunctivae/corneas clear. Neck: Supple, with full range of motion. No jugular venous distention. Trachea midline. Respiratory:  Normal respiratory effort. Clear to auscultation, bilaterally without Rales/Wheezes/Rhonchi.   Cardiovascular:  Regular rate and rhythm

## 2020-06-24 NOTE — PROGRESS NOTES
Hospitalist Progress Note    CC: Acute ST elevation myocardial infarction (STEMI) of posterior wall (HCC)      Admit date: 6/23/2020  Days in hospital:  1    Subjective/interval history: Pt S/E. Pt returned from cath lab, denies chest pain, sob. Sugars were elevated with the dextrose from the heparin gtt, but now coming down. ROS:   Pertinent items are noted in HPI. Objective:    BP (!) 142/82   Pulse 71   Temp 98.4 °F (36.9 °C) (Oral)   Resp 13   Ht 5' 5\" (1.651 m)   Wt 182 lb 8.7 oz (82.8 kg)   LMP 02/09/2015 Comment: spotting, haven't had periods approx 1 year  SpO2 99%   BMI 30.38 kg/m²     Gen: alert, NAD  HEENT: NC/AT, moist mucous membranes, no oropharyngeal erythema or exudate  Neck: supple, trachea midline, no anterior cervical or SC LAD  Heart: Normal s1/s2, RRR, no murmurs, gallops, or rubs. Lungs: clear bilaterally, no wheezing, no rales, no rhonchi, no use of accessory muscles  Abd: bowel sounds present, soft, nontender, nondistended, no masses  Extrem: No clubbing, cyanosis, no edema  Skin: no rashes or lesions  Psych: A & O x3, affect appropriate  Neuro: grossly intact, moves all four extremities spontaneously.   Cap refill: +2 sec    Medications:  Scheduled Meds:   losartan  100 mg Oral Daily    metoprolol tartrate  12.5 mg Oral BID    sodium chloride flush  10 mL Intravenous 2 times per day    [START ON 6/25/2020] enoxaparin  40 mg Subcutaneous Daily    insulin lispro  0-12 Units Subcutaneous TID     insulin lispro  0-6 Units Subcutaneous Nightly    atorvastatin  40 mg Oral Nightly    aspirin  81 mg Oral Daily    nicotine  1 patch Transdermal Daily    famotidine  20 mg Oral BID       PRN Meds:  nitroGLYCERIN, glucose, dextrose, glucagon (rDNA), dextrose, sodium chloride flush, acetaminophen, atropine, morphine, sodium chloride, polyethylene glycol, promethazine **OR** ondansetron, potassium chloride **OR** potassium alternative oral replacement **OR** consulted    Diabetes - suagrs improving   - stable, a1c ordered  - hold home PO meds for NPO  - will cover with SSI     htn  - stable  - cont bb, arb    Code status:  full  DVT prophylaxis: [x] Lovenox  [] SQ Heparin  [] SCDs because of  [] warfarin/oral direct thrombin inhibitor [] Encourage ambulation      Disposition:  [] Home [] Rehab [] Psych [] SNF  [] LTAC  [] Transfer to ICU  [] Transfer to PCU [] Other: in pt      Electronically signed by Leroy Mcgowan DO on 6/24/2020 at 1:06 PM

## 2020-06-24 NOTE — PROGRESS NOTES
Clinical Pharmacy Note  Heparin Dosing       Lab Results   Component Value Date    APTT 61.5 06/24/2020     Lab Results   Component Value Date    HGB 14.8 06/24/2020    HCT 44.4 06/24/2020     06/24/2020    INR 0.94 06/23/2020       Current Infusion Rate: 10 mL/hr    Plan:  Rate: continue at 10 mL/hr  Next aPTT: 0900 6/24/20    Pharmacy will continue to monitor and adjust based on aPTT results.   Marie Rai, ChiragD

## 2020-06-24 NOTE — CONSULTS
[facility], [place], operated by [facility]    Raina Ada  1960 June 24, 2020    Reason for Consult: Chest Pain    CC: Chest Pain    HPI:  The patient is 56 y.o. female with a past medical history significant for type 2 DM and essential hypertension who presented to the [facility] ED with chest pain. Ruma Quinonez states that she was feeling fine yesterday morning. She was reading and her chest began to hurt. She took some aspirin and Niacin with no relief after 30 minutes. The pain began to radiate to her left jaw and ear. She was short of breath with ambulation. Her vision was \"foggy\". Her chest had been hurting for about an hour prior to arrival. In the ED, nitroglycerin relieved the chest pain. Her second ECG had ischemic changes but those resolved with treatment using nitrates, beta blockade and heparin. She has had some intermittent chest pain throughout the night that resolved with sublingual nitro. She reports that her blood sugar has been well controled and her last A1C was 6.5. Review of Systems:  Constitutional: No fatigue, weakness, night sweats or fever. HEENT: No new vision difficulties or ringing in the ears. Respiratory: No new SOB, PND, orthopnea or cough. Cardiovascular: See HPI   GI: No n/v, diarrhea, constipation, abdominal pain or changes in bowel habits. No melena, no hematochezia  : No urinary frequency, urgency, incontinence, hematuria or dysuria. Skin: No cyanosis or skin lesions. Musculoskeletal: No new muscle or joint pain. Neurological: No syncope or TIA-like symptoms.   Psychiatric: No anxiety, insomnia or depression     Past Medical History:   Diagnosis Date    Allergic rhinitis     Hyperlipidemia     Hypertension     Neuropathy     Type II or unspecified type diabetes mellitus without mention of complication, not stated as uncontrolled      Past Surgical History:   Procedure Laterality Date    ANTERIOR CRUCIATE LIGAMENT REPAIR      TUBAL LIGATION       Family (GLYCOLAX) packet 17 g  17 g Oral Daily PRN Mariaelena Sampson MD        promethazine (PHENERGAN) tablet 12.5 mg  12.5 mg Oral Q6H PRN Mariaelena Sampson MD        Or    ondansetron (ZOFRAN) injection 4 mg  4 mg Intravenous Q6H PRN Mariaelena Sampson MD   4 mg at 06/23/20 2330    atorvastatin (LIPITOR) tablet 40 mg  40 mg Oral Nightly Mariaelena Sampson MD   40 mg at 06/23/20 2233    aspirin chewable tablet 81 mg  81 mg Oral Daily Mariaelena Sampson MD   81 mg at 06/24/20 0835    nicotine (NICODERM CQ) 14 MG/24HR 1 patch  1 patch Transdermal Daily Mariaelena Sampson MD        metoprolol tartrate (LOPRESSOR) tablet 25 mg  25 mg Oral BID Mariaelena Sampson MD   25 mg at 06/24/20 0835    famotidine (PEPCID) tablet 20 mg  20 mg Oral BID Mariaelena Sampson MD   20 mg at 06/24/20 0835    potassium chloride (KLOR-CON M) extended release tablet 40 mEq  40 mEq Oral PRN Mariaelena Sampson MD   40 mEq at 06/24/20 9565    Or    potassium bicarb-citric acid (EFFER-K) effervescent tablet 40 mEq  40 mEq Oral PRN Mariaelena Sampson MD        Or   Mercedes potassium chloride 10 mEq/100 mL IVPB (Peripheral Line)  10 mEq Intravenous PRN Mariaelena Sampson MD        magnesium sulfate 2 g in 50 mL IVPB premix  2 g Intravenous PRN Mariaelena Sampson MD           Physical Exam:   /70   Pulse 66   Temp 98.4 °F (36.9 °C) (Oral)   Resp 24   Ht 5' 5\" (1.651 m)   Wt 182 lb 8.7 oz (82.8 kg)   LMP 02/09/2015 Comment: spotting, haven't had periods approx 1 year  SpO2 100%   BMI 30.38 kg/m²     Intake/Output Summary (Last 24 hours) at 6/24/2020 0908  Last data filed at 6/24/2020 0548  Gross per 24 hour   Intake 216 ml   Output 1165 ml   Net -949 ml     Wt Readings from Last 2 Encounters:   06/24/20 182 lb 8.7 oz (82.8 kg)   11/21/19 216 lb 14.9 oz (98.4 kg)     Constitutional: She is oriented to person, place, and time. She appears well-developed and well-nourished. In no acute distress. Head: Normocephalic and atraumatic. Neck: Neck supple. No JVD present.  Carotid bruit

## 2020-06-25 VITALS
RESPIRATION RATE: 18 BRPM | SYSTOLIC BLOOD PRESSURE: 113 MMHG | TEMPERATURE: 97.8 F | OXYGEN SATURATION: 95 % | BODY MASS INDEX: 30.56 KG/M2 | WEIGHT: 183.42 LBS | HEIGHT: 65 IN | HEART RATE: 64 BPM | DIASTOLIC BLOOD PRESSURE: 96 MMHG

## 2020-06-25 LAB
ANION GAP SERPL CALCULATED.3IONS-SCNC: 11 MMOL/L (ref 3–16)
BUN BLDV-MCNC: 12 MG/DL (ref 7–20)
CALCIUM SERPL-MCNC: 9.2 MG/DL (ref 8.3–10.6)
CHLORIDE BLD-SCNC: 104 MMOL/L (ref 99–110)
CHOLESTEROL, TOTAL: 198 MG/DL (ref 0–199)
CO2: 25 MMOL/L (ref 21–32)
CREAT SERPL-MCNC: 0.7 MG/DL (ref 0.6–1.2)
GFR AFRICAN AMERICAN: >60
GFR NON-AFRICAN AMERICAN: >60
GLUCOSE BLD-MCNC: 117 MG/DL (ref 70–99)
GLUCOSE BLD-MCNC: 148 MG/DL (ref 70–99)
GLUCOSE BLD-MCNC: 200 MG/DL (ref 70–99)
HDLC SERPL-MCNC: 41 MG/DL (ref 40–60)
LDL CHOLESTEROL CALCULATED: 115 MG/DL
PERFORMED ON: ABNORMAL
PERFORMED ON: ABNORMAL
PLATELET # BLD: 270 K/UL (ref 135–450)
POTASSIUM SERPL-SCNC: 3.7 MMOL/L (ref 3.5–5.1)
SODIUM BLD-SCNC: 140 MMOL/L (ref 136–145)
TRIGL SERPL-MCNC: 208 MG/DL (ref 0–150)
VLDLC SERPL CALC-MCNC: 42 MG/DL

## 2020-06-25 PROCEDURE — 6370000000 HC RX 637 (ALT 250 FOR IP): Performed by: INTERNAL MEDICINE

## 2020-06-25 PROCEDURE — 6370000000 HC RX 637 (ALT 250 FOR IP): Performed by: FAMILY MEDICINE

## 2020-06-25 PROCEDURE — 80048 BASIC METABOLIC PNL TOTAL CA: CPT

## 2020-06-25 PROCEDURE — 2580000003 HC RX 258: Performed by: INTERNAL MEDICINE

## 2020-06-25 PROCEDURE — 94761 N-INVAS EAR/PLS OXIMETRY MLT: CPT

## 2020-06-25 PROCEDURE — 85049 AUTOMATED PLATELET COUNT: CPT

## 2020-06-25 PROCEDURE — 36415 COLL VENOUS BLD VENIPUNCTURE: CPT

## 2020-06-25 PROCEDURE — 80061 LIPID PANEL: CPT

## 2020-06-25 PROCEDURE — 6370000000 HC RX 637 (ALT 250 FOR IP): Performed by: NURSE PRACTITIONER

## 2020-06-25 PROCEDURE — 6360000002 HC RX W HCPCS: Performed by: INTERNAL MEDICINE

## 2020-06-25 RX ORDER — FLUTICASONE PROPIONATE 50 MCG
1 SPRAY, SUSPENSION (ML) NASAL DAILY
Qty: 2 BOTTLE | Refills: 1 | Status: SHIPPED | OUTPATIENT
Start: 2020-06-25

## 2020-06-25 RX ORDER — INSULIN GLARGINE 100 [IU]/ML
6 INJECTION, SOLUTION SUBCUTANEOUS ONCE
Status: COMPLETED | OUTPATIENT
Start: 2020-06-25 | End: 2020-06-25

## 2020-06-25 RX ORDER — ATORVASTATIN CALCIUM 40 MG/1
40 TABLET, FILM COATED ORAL NIGHTLY
Qty: 30 TABLET | Refills: 3 | Status: SHIPPED | OUTPATIENT
Start: 2020-06-25 | End: 2021-12-08

## 2020-06-25 RX ADMIN — INSULIN LISPRO 4 UNITS: 100 INJECTION, SOLUTION INTRAVENOUS; SUBCUTANEOUS at 09:02

## 2020-06-25 RX ADMIN — ASPIRIN 81 MG 81 MG: 81 TABLET ORAL at 09:00

## 2020-06-25 RX ADMIN — TICAGRELOR 90 MG: 90 TABLET ORAL at 09:00

## 2020-06-25 RX ADMIN — METOPROLOL TARTRATE 12.5 MG: 25 TABLET, FILM COATED ORAL at 09:01

## 2020-06-25 RX ADMIN — INSULIN GLARGINE 6 UNITS: 100 INJECTION, SOLUTION SUBCUTANEOUS at 10:21

## 2020-06-25 RX ADMIN — LOSARTAN POTASSIUM 100 MG: 50 TABLET, FILM COATED ORAL at 09:01

## 2020-06-25 RX ADMIN — Medication 10 ML: at 09:02

## 2020-06-25 RX ADMIN — FAMOTIDINE 20 MG: 20 TABLET ORAL at 09:00

## 2020-06-25 RX ADMIN — ENOXAPARIN SODIUM 40 MG: 40 INJECTION SUBCUTANEOUS at 09:02

## 2020-06-25 ASSESSMENT — PAIN SCALES - GENERAL
PAINLEVEL_OUTOF10: 0

## 2020-06-25 NOTE — PROGRESS NOTES
Pt had a uneventful night with no complications. Pt denied pain throughout the shift. Pt's R radial site was soft, no bleeding, no hematoma on this shift. Pt was up ad arlette in the room with no complications. Pt was told about the possibility of getting dizzy because of the meds that she is on. I instructed her to call if there was any dizziness. The pt stated that she understood. The pt had no bouts of dizziness on this shift.

## 2020-06-25 NOTE — PROGRESS NOTES
0700--I received handoff report from [facility], [address] [place] [address]. Pt A&Ox4 in bed.     1305--I spoke with Dr. Александр Connell. He states he is OK with pt being discharged. He also clarified that pt is to be on 40 mg lipitor at d/c.     1345--Discharge education completed. All questions answered. I provided pt a pamphlet to cardiac rehab and coupon for brilinta.      0420--I wheeled pt to discharge bridge where she was picked up by her daughter    Electronically signed by Deepika Grant RN on 6/25/2020 at 1:56 PM

## 2020-06-25 NOTE — PROGRESS NOTES
(THERAPEUTIC MULTIVITAMIN-MINERALS) tablet Take 1 tablet by mouth daily   Yes Historical Provider, MD   vitamin C (ASCORBIC ACID) 500 MG tablet Take 500 mg by mouth daily   Yes Historical Provider, MD   Cholecalciferol (VITAMIN D3) 125 MCG (5000 UT) TABS Take by mouth Pt unsure of dose. Yes Historical Provider, MD   aspirin 81 MG chewable tablet Take 81 mg by mouth daily   Yes Historical Provider, MD   atorvastatin (LIPITOR) 20 MG tablet Take 20 mg by mouth daily    Yes Historical Provider, MD   metFORMIN (GLUCOPHAGE) 500 MG tablet Take 1 tablet by mouth 2 times daily (with meals).  2/13/15  Yes Ree Ospina MD        CURRENT Medications:  insulin lispro (HUMALOG) injection vial 0-6 Units, TID WC  insulin lispro (HUMALOG) injection vial 0-3 Units, Nightly  nitroGLYCERIN (NITROSTAT) SL tablet 0.4 mg, Q5 Min PRN  glucose (GLUTOSE) 40 % oral gel 15 g, PRN  dextrose 50 % IV solution, PRN  glucagon (rDNA) injection 1 mg, PRN  dextrose 5 % solution, PRN  losartan (COZAAR) tablet 100 mg, Daily  metoprolol tartrate (LOPRESSOR) tablet 12.5 mg, BID  sodium chloride flush 0.9 % injection 10 mL, 2 times per day  sodium chloride flush 0.9 % injection 10 mL, PRN  acetaminophen (TYLENOL) tablet 650 mg, Q4H PRN  0.9 % sodium chloride bolus, PRN  enoxaparin (LOVENOX) injection 40 mg, Daily  traZODone (DESYREL) tablet 50 mg, Nightly  ticagrelor (BRILINTA) tablet 90 mg, BID  polyethylene glycol (GLYCOLAX) packet 17 g, Daily PRN  promethazine (PHENERGAN) tablet 12.5 mg, Q6H PRN    Or  ondansetron (ZOFRAN) injection 4 mg, Q6H PRN  atorvastatin (LIPITOR) tablet 40 mg, Nightly  aspirin chewable tablet 81 mg, Daily  nicotine (NICODERM CQ) 14 MG/24HR 1 patch, Daily  famotidine (PEPCID) tablet 20 mg, BID  potassium chloride (KLOR-CON M) extended release tablet 40 mEq, PRN    Or  potassium bicarb-citric acid (EFFER-K) effervescent tablet 40 mEq, PRN    Or  potassium chloride 10 mEq/100 mL IVPB (Peripheral Line), PRN  magnesium sulfate 2 g in 50 mL IVPB premix, PRN        Allergies:  Percocet [oxycodone-acetaminophen]           Review of Systems: SEE HPI   · Constitutional: no unanticipated weight loss. There's been no change in energy level, sleep pattern, or activity level. No fevers, chills. · Eyes: No visual changes or diplopia. No scleral icterus. · ENT: No Headaches, hearing loss or vertigo. No mouth sores or sore throat. · Cardiovascular: No Chest pain, tightness or discomfort.  No Shortness of breath. No Dyspnea on exertion, Orthopnea, Paroxysmal nocturnal dyspnea or breathlessness at rest.   No Palpitations.  No Syncope ('blackouts', 'faints', 'collapse') or dizziness. · Respiratory: No cough or wheezing, no sputum production. No hematemesis. · Gastrointestinal: No abdominal pain, appetite loss, blood in stools. No change in bowel or bladder habits. · Genitourinary: No dysuria, trouble voiding, or hematuria. · Musculoskeletal:  No gait disturbance, no joint complaints. · Integumentary: No rash or pruritis. · Neurological: No headache, diplopia, change in muscle strength, numbness or tingling. · Psychiatric: No anxiety or depression. · Endocrine: No temperature intolerance. No excessive thirst, fluid intake, or urination. No tremor. · Hematologic/Lymphatic: No abnormal bruising or bleeding, blood clots or swollen lymph nodes. · Allergic/Immunologic: No nasal congestion or hives. Objective:     PHYSICAL EXAM:      Vitals:    06/25/20 0857   BP:    Pulse:    Resp: 16   Temp:    SpO2: 95%    Weight: 183 lb 6.8 oz (83.2 kg)       General Appearance:  Alert, cooperative, no distress, appears stated age. Head:  Normocephalic, without obvious abnormality, atraumatic. Eyes:  Pupils equal and round. No scleral icterus. Mouth: Moist mucosa, no pharyngeal erythema. Nose: Nares normal. No drainage or sinus tenderness. Neck: Supple, symmetrical, trachea midline. No adenopathy. No tenderness/mass/nodules.  No overlapping  3.5-mm stents. More distal one was 38 mm, the more proximal was 15 mm. These were post dilated to 3.91 mm in diameter. There was 0% residual  stenosis and DAVID 3 flow in the vessel. In the left system, there is no  significant left main, circumflex, or left anterior descending artery  disease. 2.  Low-normal left ventricular systolic function with LV ejection  fraction of 50%. Mild inferior basal to mid hypokinesis. 3.  Normal left ventricular end-diastolic pressure at 5 mmHg. 4.  No gradient across the aortic valve on pullback to suggest aortic  stenosis. Other imaging:     Assessment and Plan     --Acute Coronary Syndrome/NSTEMI:  Chest Pain: Precordial substernal probably ischemic  Patient with intermediate to high risk Acute Coronary Syndrome with elevated DAVID Risk score   Needs aggressive risk factor modification  Needs Anginal pain control  Start ASA, statin, DAPT, BB    Status post RCA PCI    Thank you for allowing us to participate in the care of Sanchez Devlin. Please do not hesitate to contact me if you have any questions.     Ray Dobbs MD, MPH    [place] [address]  Ph: [phone]  Fax: [phone]    6/25/2020 12:12 PM

## 2020-06-25 NOTE — DISCHARGE SUMMARY
effort. Clear to auscultation, bilaterally without Rales/Wheezes/Rhonchi. Cardiovascular:  Regular rate and rhythm with normal S1/S2 without murmurs, rubs or gallops. Abdomen: Soft, non-tender, non-distended with normal bowel sounds. Musculoskeletal:  No clubbing, cyanosis or edema bilaterally. Full range of motion without deformity. Skin: Skin color, texture, turgor normal.  No rashes or lesions. Neurologic:  Neurovascularly intact without any focal sensory/motor deficits. Cranial nerves: II-XII intact, grossly non-focal.  Psychiatric:  Alert and oriented, thought content appropriate, normal insight    Consults:     IP CONSULT TO CARDIOLOGY  IP CONSULT TO CARDIAC REHAB    Labs: For convenience and continuity at follow-up the following most recent labs are provided:    Lab Results   Component Value Date    WBC 11.0 06/24/2020    HGB 14.8 06/24/2020    HCT 44.4 06/24/2020    MCV 86.9 06/24/2020     06/25/2020     06/25/2020    K 3.7 06/25/2020    K 3.5 06/24/2020     06/25/2020    CO2 25 06/25/2020    BUN 12 06/25/2020    CREATININE 0.7 06/25/2020    CALCIUM 9.2 06/25/2020    ALKPHOS 100 06/23/2020    ALT 16 06/23/2020    AST 13 06/23/2020    BILITOT <0.2 06/23/2020    LABALBU 4.3 06/23/2020    LDLCALC 115 06/25/2020    TRIG 208 06/25/2020     Lab Results   Component Value Date    INR 0.94 06/23/2020       Radiology:  XR CHEST PORTABLE   Final Result   No acute cardiopulmonary process             Discharge Medications:   Current Discharge Medication List      START taking these medications    Details   !! atorvastatin (LIPITOR) 40 MG tablet Take 1 tablet by mouth nightly  Qty: 30 tablet, Refills: 3      metoprolol tartrate (LOPRESSOR) 25 MG tablet Take 0.5 tablets by mouth 2 times daily  Qty: 60 tablet, Refills: 3      ticagrelor (BRILINTA) 90 MG TABS tablet Take 1 tablet by mouth 2 times daily  Qty: 60 tablet, Refills: 3       !! - Potential duplicate medications found.  Please discuss with Justa Wells MD for the opportunity to be involved in this patient's care. If you have any questions or concerns please feel free to contact me at [phone].

## 2020-07-03 NOTE — PROGRESS NOTES
[address]  Office Visit    Danii Murguia  1960 July 7, 2020    CC:   Chief Complaint   Patient presents with    Follow-Up from Hospital     NSTEMI, HTN, HLD/ no edema, headaches, palpitations     Chest Pain     dull ache, under left breast, lasting not long:  pt states she takes an advil:  pt feels like it's a healing pain     Shortness of Breath     a little after taking Brilinta     Dizziness     a little after taking Brilinta      HPI:  The patient is 56 y.o. female with a past medical history significant for HTN and hyperlipidemia who is here for hospital follow up. She was hospitalized with a NSTEMI from 6/23/2020-6/25/2020 at [facility]. On 6/24/2020 she had a LHC with placement of stents x 2 to RCA. LVEF was 50%. She was discharged and here for follow up. Overall feeling fairly well. Has occasional \"ache\" in her left chest, different from MI pain and lasting < 5 minutes. She has not been exercising regularly, but walks short distances. She denies SOB with exertion. Has to take deep sigh/breaths after Brilinta and improves rather quickly. Denies dizziness/syncope. Denies anginal chest pain/discomfort,  orthopnea/PND, cough, palpitations, syncope, edema , weight change or claudication. Has not resumed smoking since hospital stay. Asking about an exercise program.    Review of Systems:  Constitutional: Denies  fatigue, weakness, night sweats or fever. HEENT: Denies new visual changes, ringing in ears, nosebleeds,nasal congestion  Respiratory: Denies new or change in SOB, PND, orthopnea or cough. Cardiovascular: see HPI  GI: Denies N/V, diarrhea, constipation, abdominal pain, change in bowel habits, melena or hematochezia  : Denies urinary frequency, urgency, incontinence, hematuria or dysuria. Skin: Denies rash, hives, or cyanosis  Musculoskeletal: Denies joint or muscle aches/pain  Neurological: Denies syncope or TIA-like symptoms.   Psychiatric: Denies anxiety, insomnia or depression     Past Medical History:   Diagnosis Date    Allergic rhinitis     Hyperlipidemia     Hypertension     Neuropathy     Type II or unspecified type diabetes mellitus without mention of complication, not stated as uncontrolled      Past Surgical History:   Procedure Laterality Date    ANTERIOR CRUCIATE LIGAMENT REPAIR      PTCA      S[place]ET[place] to RCA    TUBAL LIGATION       Family History   Problem Relation Age of Onset    Arthritis Mother     High Blood Pressure Maternal Grandmother      Social History     Tobacco Use    Smoking status: Current Some Day Smoker     Packs/day: 0.25     Years: 5.00     Pack years: 1.25    Smokeless tobacco: Never Used   Substance Use Topics    Alcohol use: Yes     Comment: a little wine on holidays    Drug use: No       Allergies   Allergen Reactions    Percocet [Oxycodone-Acetaminophen] Nausea Only     Current Outpatient Medications   Medication Sig Dispense Refill    hydroCHLOROthiazide (MICROZIDE) 12.5 MG capsule Take 1 capsule by mouth every morning 30 capsule 5    metoprolol succinate (TOPROL XL) 25 MG extended release tablet Take 1 tablet by mouth daily 30 tablet 3    atorvastatin (LIPITOR) 40 MG tablet Take 1 tablet by mouth nightly 30 tablet 3    ticagrelor (BRILINTA) 90 MG TABS tablet Take 1 tablet by mouth 2 times daily 60 tablet 3    fluticasone (FLONASE) 50 MCG/ACT nasal spray 1 spray by Each Nostril route daily 2 Bottle 1    gabapentin (NEURONTIN) 300 MG capsule Take 300 mg by mouth as needed. 300 mg in AM and 600 mg at Bedtime       losartan (COZAAR) 100 MG tablet Take 100 mg by mouth daily       Multiple Vitamins-Minerals (THERAPEUTIC MULTIVITAMIN-MINERALS) tablet Take 1 tablet by mouth daily      vitamin C (ASCORBIC ACID) 500 MG tablet Take 500 mg by mouth daily      Cholecalciferol (VITAMIN D3) 125 MCG (5000 UT) TABS Take by mouth Pt unsure of dose.       aspirin 81 MG chewable tablet Take 81 mg by mouth daily      metFORMIN (GLUCOPHAGE) 500 MG tablet Take 1 tablet by mouth 2 times daily (with meals). 180 tablet 3     No current facility-administered medications for this visit. Physical Exam:   BP (!) 180/90   Pulse 62   Temp 98.3 °F (36.8 °C)   Ht 5' 5\" (1.651 m)   Wt 184 lb (83.5 kg) Comment: WITH SHOES  LMP 02/09/2015 Comment: spotting, haven't had periods approx 1 year  SpO2 99%   BMI 30.62 kg/m²   Wt Readings from Last 2 Encounters:   07/07/20 184 lb (83.5 kg)   06/25/20 183 lb 6.8 oz (83.2 kg)     Constitutional: She is oriented to person, place, and time. She appears well-developed and well-nourished. In no acute distress. HEENT: Normocephalic and atraumatic. Sclerae anicteric. No xanthelasmas. EOM's intact. Neck: Supple. No JVD present. Carotids without bruits. No thyromegaly present. No lymphadenopathy present. Cardiovascular: RRR, normal S1 and S2; no murmur/gallop or rub  Pulmonary/Chest: Effort normal.  Lungs clear to auscultation. Chest wall nontender  Abdominal: soft, nontender, nondistended. + bowel sounds; no hepatomegaly Extremities: No edema, cyanosis, or clubbing. Pulses are 2+ radial/DP/PT bilaterally. Cap refill brisk. Neurological: No focal deficit. Skin: Skin is warm and dry. Psychiatric: She has a normal mood and affect.  Her speech is normal and behavior is normal.     Lab Review:   Lab Results   Component Value Date    TRIG 208 06/25/2020    HDL 41 06/25/2020    LDLCALC 115 06/25/2020    LABVLDL 42 06/25/2020     Lab Results   Component Value Date     06/25/2020    K 3.7 06/25/2020    K 3.5 06/24/2020     06/25/2020    CO2 25 06/25/2020    BUN 12 06/25/2020    CREATININE 0.7 06/25/2020    GLUCOSE 117 06/25/2020    CALCIUM 9.2 06/25/2020      Lab Results   Component Value Date    WBC 11.0 06/24/2020    HGB 14.8 06/24/2020    HCT 44.4 06/24/2020    MCV 86.9 06/24/2020     06/25/2020     ECG 7/7/2020:  SR with age indeterminate inferior infarct; inferior and anterolateral TW changes (? Ischemia)    6/25/2020 LHC/PCI:  1. Right-dominant coronary arterial system with a 95% proximal to mid  RCA lesion with DAVID 2 flow. This was intervened upon with overlapping  3.5-mm stents. More distal one was 38 mm, the more proximal was 15 mm. These were post dilated to 3.91 mm in diameter. There was 0% residual  stenosis and DAVID 3 flow in the vessel. In the left system, there is no  significant left main, circumflex, or left anterior descending artery  disease. 2.  Low-normal left ventricular systolic function with LV ejection  fraction of 50%. Mild inferior basal to mid hypokinesis. 3.  Normal left ventricular end-diastolic pressure at 5 mmHg. 4.  No gradient across the aortic valve on pullback to suggest aortic  stenosis. Assessment:    1. NSTEMI (non-ST elevated myocardial infarction) ([address]  -S/P stents x 2 to RCA on 6/25/2020; no significant LCX or LAD disease  -no recurrent angina  -atypical chest pain (which has improved)  -continue statin, BB, ASA, losartan and Brilinta  -referred to cardiac rehab    2. Essential hypertension  -not well controlled  -needs tighter control  -continue medical management  -change Metoprolol to long acting and add HCTZ    3. Dyslipidemia  -goal LDL < 70  -continue statin    4. Tobacco use  -encouraged        Plan:  Add HCTZ 12.5 mg daily  Change metoprolol tartrate to Toprol XL 25 mg daily  Continue statin , ASA, Brilinta, losartan  Discussed low fat/low sodium diet and reinforced regular aerobic exercise. Refer to cardiac rehab  Discussed exercise regimen and how to increase her activity/exercise. Labs from 6/25/2020 reviewed  Check BMP in 10-14 days  Check echocardiogram in interest of LV function/LVH  Emphasized and discussed strategies for smoking cessation (> 3-5 minutes of counseling given)  Follow up with Dr. Eulogio Culp or D. Enzweiler, CNP in 3-4 weeks or sooner if needed    Return in about 3 weeks (around 7/28/2020) for 3-4 weeks with SHADE Enzweiler, CNP. Thanks for allowing me to participate in the care of this patient.       Maria Victoria Shen, NATY-CNP  [address], [address]) [place], [address] [place] [address]  [facility] Joh[facility]  Office: [phone]  Fax: [phone]

## 2020-07-07 ENCOUNTER — OFFICE VISIT (OUTPATIENT)
Dept: CARDIOLOGY CLINIC | Age: 60
End: 2020-07-07
Payer: COMMERCIAL

## 2020-07-07 VITALS
BODY MASS INDEX: 30.66 KG/M2 | DIASTOLIC BLOOD PRESSURE: 90 MMHG | HEART RATE: 62 BPM | OXYGEN SATURATION: 99 % | HEIGHT: 65 IN | SYSTOLIC BLOOD PRESSURE: 180 MMHG | TEMPERATURE: 98.3 F | WEIGHT: 184 LBS

## 2020-07-07 PROCEDURE — 4004F PT TOBACCO SCREEN RCVD TLK: CPT | Performed by: NURSE PRACTITIONER

## 2020-07-07 PROCEDURE — 1111F DSCHRG MED/CURRENT MED MERGE: CPT | Performed by: NURSE PRACTITIONER

## 2020-07-07 PROCEDURE — 93000 ELECTROCARDIOGRAM COMPLETE: CPT | Performed by: NURSE PRACTITIONER

## 2020-07-07 PROCEDURE — 3017F COLORECTAL CA SCREEN DOC REV: CPT | Performed by: NURSE PRACTITIONER

## 2020-07-07 PROCEDURE — G8417 CALC BMI ABV UP PARAM F/U: HCPCS | Performed by: NURSE PRACTITIONER

## 2020-07-07 PROCEDURE — 99214 OFFICE O/P EST MOD 30 MIN: CPT | Performed by: NURSE PRACTITIONER

## 2020-07-07 PROCEDURE — G8427 DOCREV CUR MEDS BY ELIG CLIN: HCPCS | Performed by: NURSE PRACTITIONER

## 2020-07-07 RX ORDER — METOPROLOL SUCCINATE 25 MG/1
25 TABLET, EXTENDED RELEASE ORAL DAILY
Qty: 30 TABLET | Refills: 3 | Status: SHIPPED | OUTPATIENT
Start: 2020-07-07 | End: 2020-08-04

## 2020-07-07 RX ORDER — HYDROCHLOROTHIAZIDE 12.5 MG/1
12.5 CAPSULE, GELATIN COATED ORAL EVERY MORNING
Qty: 30 CAPSULE | Refills: 5 | Status: SHIPPED | OUTPATIENT
Start: 2020-07-07

## 2020-08-02 NOTE — PROGRESS NOTES
[facility]  Office Visit    Faby Kwon  1960 August 4, 2020    CC:   Chief Complaint   Patient presents with    1 Month Follow-Up     \"Feel better than when I saw you last.\"     HPI:  The patient is 56 y.o. female with a past medical history significant for HTN and hyperlipidemia who is here for hospital follow up. She was hospitalized with a NSTEMI from 6/23/2020-6/25/2020 at [facility]. On 6/24/2020 she had a [facility] with placement of stents x 2 to RCA. LVEF was 50%. She was seen in office follow up on 7/14/2020 and her BP was elevated. Her medications were adjusted and metoprolol changed to long acting Toprol and HCTZ was added. She was also to have an echo which has not been completed. She was also referred to cardiac rehab. Overall feeling much better than when seen last. Walking 30 minutes 3-4 x week without difficulty. Denies anginal chest pain, SOB,  orthopnea/PND, cough, palpitations, dizziness, syncope, edema , weight change or claudication. Has adjusted diet and feeling better. Has remained off cigarettes since MI. Review of Systems:  Constitutional: Denies  fatigue, weakness, night sweats or fever. HEENT: Denies new visual changes, ringing in ears, nosebleeds,nasal congestion  Respiratory: Denies new or change in SOB, PND, orthopnea or cough. Cardiovascular: see HPI  GI: Denies N/V, diarrhea, constipation, abdominal pain, change in bowel habits, melena or hematochezia  : Denies urinary frequency, urgency, incontinence, hematuria or dysuria. Skin: Denies rash, hives, or cyanosis  Musculoskeletal: Denies joint or muscle aches/pain  Neurological: Denies syncope or TIA-like symptoms.   Psychiatric: Denies anxiety, insomnia or depression     Past Medical History:   Diagnosis Date    Allergic rhinitis     Hyperlipidemia     Hypertension     Neuropathy     Type II or unspecified type diabetes mellitus without mention of complication, not stated as uncontrolled      Past Surgical History:   Procedure Laterality Date    ANTERIOR CRUCIATE LIGAMENT REPAIR      PTCA      SANGEETA to RCA    TUBAL LIGATION       Family History   Problem Relation Age of Onset    Arthritis Mother     High Blood Pressure Maternal Grandmother      Social History     Tobacco Use    Smoking status: Former Smoker     Packs/day: 0.25     Years: 5.00     Pack years: 1.25     Types: Cigarettes     Last attempt to quit: 2020     Years since quittin.1    Smokeless tobacco: Never Used   Substance Use Topics    Alcohol use: Yes     Comment: a little wine on holidays    Drug use: No       Allergies   Allergen Reactions    Percocet [Oxycodone-Acetaminophen] Nausea Only     Current Outpatient Medications   Medication Sig Dispense Refill    metoprolol succinate (TOPROL XL) 50 MG extended release tablet Take 0.5 tablets by mouth daily 90 tablet 2    hydroCHLOROthiazide (MICROZIDE) 12.5 MG capsule Take 1 capsule by mouth every morning 30 capsule 5    atorvastatin (LIPITOR) 40 MG tablet Take 1 tablet by mouth nightly 30 tablet 3    ticagrelor (BRILINTA) 90 MG TABS tablet Take 1 tablet by mouth 2 times daily 60 tablet 3    fluticasone (FLONASE) 50 MCG/ACT nasal spray 1 spray by Each Nostril route daily 2 Bottle 1    gabapentin (NEURONTIN) 300 MG capsule Take 300 mg by mouth as needed. 300 mg in AM and 600 mg at Bedtime       losartan (COZAAR) 100 MG tablet Take 100 mg by mouth daily       Multiple Vitamins-Minerals (THERAPEUTIC MULTIVITAMIN-MINERALS) tablet Take 1 tablet by mouth daily      vitamin C (ASCORBIC ACID) 500 MG tablet Take 500 mg by mouth daily      Cholecalciferol (VITAMIN D3) 125 MCG (5000 UT) TABS Take by mouth Pt unsure of dose.  aspirin 81 MG chewable tablet Take 81 mg by mouth daily      metFORMIN (GLUCOPHAGE) 500 MG tablet Take 1 tablet by mouth 2 times daily (with meals). 180 tablet 3     No current facility-administered medications for this visit.       Physical Exam:   BP 132/88   Pulse 85   Temp 98.6 °F (37 °C)   Ht 5' 5\" (1.651 m)   Wt 179 lb 9.6 oz (81.5 kg)   LMP 02/09/2015 Comment: spotting, haven't had periods approx 1 year  SpO2 98%   BMI 29.89 kg/m²   Wt Readings from Last 2 Encounters:   08/04/20 179 lb 9.6 oz (81.5 kg)   07/07/20 184 lb (83.5 kg)     Constitutional: She is oriented to person, place, and time. She appears well-developed and well-nourished. In no acute distress. HEENT: Normocephalic and atraumatic. Sclerae anicteric. No xanthelasmas. EOM's intact. Neck: Supple. No JVD present. Carotids without bruits. Cardiovascular: RRR, normal S1 and S2; no murmur/gallop or rub  Pulmonary/Chest: Effort normal.  Lungs clear to auscultation. Chest wall nontender  Abdominal: soft, nontender, nondistended. + bowel sounds; Extremities: No edema, cyanosis, or clubbing. Pulses are 2+ radial/carotid/dorsalis pedis bilaterally. Cap refill brisk. Neurological: No focal deficit. Skin: Skin is warm and dry. Psychiatric: She has a normal mood and affect. Her speech is normal and behavior is normal.     Lab Review:   Lab Results   Component Value Date    TRIG 208 06/25/2020    HDL 41 06/25/2020    LDLCALC 115 06/25/2020    LABVLDL 42 06/25/2020     Lab Results   Component Value Date     06/25/2020    K 3.7 06/25/2020    K 3.5 06/24/2020     06/25/2020    CO2 25 06/25/2020    BUN 12 06/25/2020    CREATININE 0.7 06/25/2020    GLUCOSE 117 06/25/2020    CALCIUM 9.2 06/25/2020      Lab Results   Component Value Date    WBC 11.0 06/24/2020    HGB 14.8 06/24/2020    HCT 44.4 06/24/2020    MCV 86.9 06/24/2020     06/25/2020     ECG 7/7/2020:  SR with age indeterminate inferior infarct; inferior and anterolateral TW changes (? Ischemia)    6/25/2020 LHC/PCI:  1. Right-dominant coronary arterial system with a 95% proximal to mid  RCA lesion with DAVID 2 flow. This was intervened upon with overlapping  3.5-mm stents.   More distal one was 38 mm, the more

## 2020-08-04 ENCOUNTER — OFFICE VISIT (OUTPATIENT)
Dept: CARDIOLOGY CLINIC | Age: 60
End: 2020-08-04
Payer: COMMERCIAL

## 2020-08-04 VITALS
DIASTOLIC BLOOD PRESSURE: 88 MMHG | SYSTOLIC BLOOD PRESSURE: 132 MMHG | HEIGHT: 65 IN | BODY MASS INDEX: 29.92 KG/M2 | OXYGEN SATURATION: 98 % | TEMPERATURE: 98.6 F | WEIGHT: 179.6 LBS | HEART RATE: 85 BPM

## 2020-08-04 PROCEDURE — G8427 DOCREV CUR MEDS BY ELIG CLIN: HCPCS | Performed by: NURSE PRACTITIONER

## 2020-08-04 PROCEDURE — 1036F TOBACCO NON-USER: CPT | Performed by: NURSE PRACTITIONER

## 2020-08-04 PROCEDURE — G8417 CALC BMI ABV UP PARAM F/U: HCPCS | Performed by: NURSE PRACTITIONER

## 2020-08-04 PROCEDURE — 99213 OFFICE O/P EST LOW 20 MIN: CPT | Performed by: NURSE PRACTITIONER

## 2020-08-04 PROCEDURE — 3017F COLORECTAL CA SCREEN DOC REV: CPT | Performed by: NURSE PRACTITIONER

## 2020-08-04 RX ORDER — METOPROLOL SUCCINATE 50 MG/1
25 TABLET, EXTENDED RELEASE ORAL DAILY
Qty: 90 TABLET | Refills: 2 | Status: SHIPPED
Start: 2020-08-04 | End: 2020-09-18 | Stop reason: ALTCHOICE

## 2020-08-04 NOTE — PATIENT INSTRUCTIONS
Increase Metoprolol Succinate to 50 mg daily    Continue other medications      Cardiac rehab:  [phone]

## 2020-08-21 ENCOUNTER — HOSPITAL ENCOUNTER (OUTPATIENT)
Dept: CARDIAC REHAB | Age: 60
Setting detail: THERAPIES SERIES
Discharge: HOME | End: 2020-08-21
Payer: COMMERCIAL

## 2020-08-21 PROCEDURE — 93798 PHYS/QHP OP CAR RHAB W/ECG: CPT

## 2020-08-24 ENCOUNTER — HOSPITAL ENCOUNTER (OUTPATIENT)
Dept: CARDIAC REHAB | Age: 60
Setting detail: THERAPIES SERIES
Discharge: HOME | End: 2020-08-24
Payer: COMMERCIAL

## 2020-08-24 LAB
GLUCOSE BLD-MCNC: 107 MG/DL (ref 70–99)
GLUCOSE BLD-MCNC: 136 MG/DL (ref 70–99)
PERFORMED ON: ABNORMAL
PERFORMED ON: ABNORMAL

## 2020-08-24 PROCEDURE — 93798 PHYS/QHP OP CAR RHAB W/ECG: CPT

## 2020-08-26 ENCOUNTER — HOSPITAL ENCOUNTER (OUTPATIENT)
Dept: CARDIAC REHAB | Age: 60
Setting detail: THERAPIES SERIES
Discharge: HOME | End: 2020-08-26
Payer: COMMERCIAL

## 2020-08-26 LAB
GLUCOSE BLD-MCNC: 121 MG/DL (ref 70–99)
GLUCOSE BLD-MCNC: 94 MG/DL (ref 70–99)
PERFORMED ON: ABNORMAL
PERFORMED ON: NORMAL

## 2020-08-26 PROCEDURE — 93798 PHYS/QHP OP CAR RHAB W/ECG: CPT

## 2020-08-28 ENCOUNTER — HOSPITAL ENCOUNTER (OUTPATIENT)
Dept: CARDIAC REHAB | Age: 60
Setting detail: THERAPIES SERIES
Discharge: HOME | End: 2020-08-28
Payer: COMMERCIAL

## 2020-08-28 LAB
GLUCOSE BLD-MCNC: 106 MG/DL (ref 70–99)
GLUCOSE BLD-MCNC: 119 MG/DL (ref 70–99)
PERFORMED ON: ABNORMAL
PERFORMED ON: ABNORMAL

## 2020-08-28 PROCEDURE — 93798 PHYS/QHP OP CAR RHAB W/ECG: CPT

## 2020-08-31 ENCOUNTER — HOSPITAL ENCOUNTER (OUTPATIENT)
Dept: CARDIAC REHAB | Age: 60
Setting detail: THERAPIES SERIES
Discharge: HOME | End: 2020-08-31
Payer: COMMERCIAL

## 2020-08-31 PROCEDURE — 93798 PHYS/QHP OP CAR RHAB W/ECG: CPT

## 2020-09-02 ENCOUNTER — HOSPITAL ENCOUNTER (OUTPATIENT)
Dept: CARDIAC REHAB | Age: 60
Setting detail: THERAPIES SERIES
Discharge: HOME | End: 2020-09-02
Payer: COMMERCIAL

## 2020-09-02 PROCEDURE — 93798 PHYS/QHP OP CAR RHAB W/ECG: CPT

## 2020-09-04 ENCOUNTER — HOSPITAL ENCOUNTER (OUTPATIENT)
Dept: CARDIAC REHAB | Age: 60
Setting detail: THERAPIES SERIES
End: 2020-09-04
Payer: COMMERCIAL

## 2020-09-09 ENCOUNTER — HOSPITAL ENCOUNTER (OUTPATIENT)
Dept: CARDIAC REHAB | Age: 60
Setting detail: THERAPIES SERIES
Discharge: HOME | End: 2020-09-09
Payer: COMMERCIAL

## 2020-09-09 PROCEDURE — 93798 PHYS/QHP OP CAR RHAB W/ECG: CPT

## 2020-09-11 ENCOUNTER — HOSPITAL ENCOUNTER (OUTPATIENT)
Dept: CARDIAC REHAB | Age: 60
Setting detail: THERAPIES SERIES
Discharge: HOME | End: 2020-09-11
Payer: COMMERCIAL

## 2020-09-11 PROCEDURE — 93798 PHYS/QHP OP CAR RHAB W/ECG: CPT

## 2020-09-14 ENCOUNTER — HOSPITAL ENCOUNTER (OUTPATIENT)
Dept: CARDIAC REHAB | Age: 60
Setting detail: THERAPIES SERIES
Discharge: HOME | End: 2020-09-14
Payer: COMMERCIAL

## 2020-09-14 PROCEDURE — 93798 PHYS/QHP OP CAR RHAB W/ECG: CPT

## 2020-09-16 ENCOUNTER — HOSPITAL ENCOUNTER (OUTPATIENT)
Dept: CARDIAC REHAB | Age: 60
Setting detail: THERAPIES SERIES
Discharge: HOME | End: 2020-09-16
Payer: COMMERCIAL

## 2020-09-16 PROCEDURE — 93798 PHYS/QHP OP CAR RHAB W/ECG: CPT

## 2020-09-17 ENCOUNTER — HOSPITAL ENCOUNTER (OUTPATIENT)
Dept: NON INVASIVE DIAGNOSTICS | Age: 60
Discharge: HOME | End: 2020-09-17
Payer: COMMERCIAL

## 2020-09-17 LAB
LV EF: 58 %
LVEF MODALITY: NORMAL

## 2020-09-17 PROCEDURE — 93306 TTE W/DOPPLER COMPLETE: CPT

## 2020-09-18 ENCOUNTER — HOSPITAL ENCOUNTER (OUTPATIENT)
Dept: CARDIAC REHAB | Age: 60
Setting detail: THERAPIES SERIES
Discharge: HOME | End: 2020-09-18
Payer: COMMERCIAL

## 2020-09-18 PROCEDURE — 93798 PHYS/QHP OP CAR RHAB W/ECG: CPT

## 2020-09-18 NOTE — TELEPHONE ENCOUNTER
Dr. Keara Woods    Patient last seen in office with Billy Atwood on 8/4/20. Due to elevated BP, Toprol was increased to 50 mg daily. She has continued to have elevated BP readings at cardiac rehab. I reviewed rehab notes and her BP has been consistently around 109D systolic. She is taking losartan 100 mg in addition to Toprol.

## 2020-09-18 NOTE — TELEPHONE ENCOUNTER
Please call patient with Dr. Roseanne Srivastava instructions. I have updated medication list and pended Rx for Dr. White to sign. Please route to Dr. White after speaking with patient.

## 2020-09-21 ENCOUNTER — HOSPITAL ENCOUNTER (OUTPATIENT)
Dept: CARDIAC REHAB | Age: 60
Setting detail: THERAPIES SERIES
Discharge: HOME | End: 2020-09-21
Payer: COMMERCIAL

## 2020-09-21 PROCEDURE — 93798 PHYS/QHP OP CAR RHAB W/ECG: CPT

## 2020-09-21 RX ORDER — CARVEDILOL 12.5 MG/1
12.5 TABLET ORAL 2 TIMES DAILY
Qty: 60 TABLET | Refills: 5 | Status: SHIPPED
Start: 2020-09-21 | End: 2020-10-09 | Stop reason: DRUGHIGH

## 2020-09-23 ENCOUNTER — HOSPITAL ENCOUNTER (OUTPATIENT)
Dept: CARDIAC REHAB | Age: 60
Setting detail: THERAPIES SERIES
Discharge: HOME | End: 2020-09-23
Payer: COMMERCIAL

## 2020-09-23 PROCEDURE — 93798 PHYS/QHP OP CAR RHAB W/ECG: CPT

## 2020-09-25 ENCOUNTER — HOSPITAL ENCOUNTER (OUTPATIENT)
Dept: CARDIAC REHAB | Age: 60
Setting detail: THERAPIES SERIES
Discharge: HOME | End: 2020-09-25
Payer: COMMERCIAL

## 2020-09-25 PROCEDURE — 93798 PHYS/QHP OP CAR RHAB W/ECG: CPT

## 2020-09-28 ENCOUNTER — HOSPITAL ENCOUNTER (OUTPATIENT)
Dept: CARDIAC REHAB | Age: 60
Setting detail: THERAPIES SERIES
Discharge: HOME | End: 2020-09-28
Payer: COMMERCIAL

## 2020-09-28 PROCEDURE — 93798 PHYS/QHP OP CAR RHAB W/ECG: CPT

## 2020-09-30 ENCOUNTER — HOSPITAL ENCOUNTER (OUTPATIENT)
Dept: CARDIAC REHAB | Age: 60
Setting detail: THERAPIES SERIES
Discharge: HOME | End: 2020-09-30
Payer: COMMERCIAL

## 2020-09-30 PROCEDURE — 93798 PHYS/QHP OP CAR RHAB W/ECG: CPT

## 2020-10-02 ENCOUNTER — HOSPITAL ENCOUNTER (OUTPATIENT)
Dept: CARDIAC REHAB | Age: 60
Setting detail: THERAPIES SERIES
Discharge: HOME | End: 2020-10-02
Payer: COMMERCIAL

## 2020-10-02 PROCEDURE — 93798 PHYS/QHP OP CAR RHAB W/ECG: CPT

## 2020-10-05 ENCOUNTER — APPOINTMENT (OUTPATIENT)
Dept: CARDIAC REHAB | Age: 60
End: 2020-10-05
Payer: COMMERCIAL

## 2020-10-07 ENCOUNTER — HOSPITAL ENCOUNTER (OUTPATIENT)
Dept: CARDIAC REHAB | Age: 60
Setting detail: THERAPIES SERIES
Discharge: HOME | End: 2020-10-07
Payer: COMMERCIAL

## 2020-10-07 PROCEDURE — 93798 PHYS/QHP OP CAR RHAB W/ECG: CPT

## 2020-10-08 ENCOUNTER — TELEPHONE (OUTPATIENT)
Dept: CARDIOLOGY CLINIC | Age: 60
End: 2020-10-08

## 2020-10-08 NOTE — TELEPHONE ENCOUNTER
Cardiac Rehab faxed the pt's BP readings which are high. LVM for patient to call    Per Liz RN:   Ask pt if she is taking Carvedilol since it was switched on 9/17/2020 from Toprol.    Ask pt if she is taking medication before going to cardiac rehab

## 2020-10-09 ENCOUNTER — HOSPITAL ENCOUNTER (OUTPATIENT)
Dept: CARDIAC REHAB | Age: 60
Setting detail: THERAPIES SERIES
Discharge: HOME | End: 2020-10-09
Payer: COMMERCIAL

## 2020-10-09 PROCEDURE — 93798 PHYS/QHP OP CAR RHAB W/ECG: CPT

## 2020-10-09 RX ORDER — CARVEDILOL 25 MG/1
25 TABLET ORAL 2 TIMES DAILY
Qty: 30 TABLET | Refills: 0 | Status: SHIPPED | OUTPATIENT
Start: 2020-10-09 | End: 2020-10-26

## 2020-10-09 NOTE — TELEPHONE ENCOUNTER
Discussed with Dr. Yahaira Sanchez and patient should increase her dose of carvedilol to 25 mg BID. Please call patient to discuss.

## 2020-10-09 NOTE — TELEPHONE ENCOUNTER
Patients states she is taking coreg as prescribed and she is taking it before cardiac rehab. She was unable to take her BP this morning because she is out of batteries.

## 2020-10-14 ENCOUNTER — HOSPITAL ENCOUNTER (OUTPATIENT)
Dept: CARDIAC REHAB | Age: 60
Setting detail: THERAPIES SERIES
Discharge: HOME | End: 2020-10-14
Payer: COMMERCIAL

## 2020-10-14 PROCEDURE — 93798 PHYS/QHP OP CAR RHAB W/ECG: CPT

## 2020-10-16 ENCOUNTER — HOSPITAL ENCOUNTER (OUTPATIENT)
Dept: CARDIAC REHAB | Age: 60
Setting detail: THERAPIES SERIES
Discharge: HOME | End: 2020-10-16
Payer: COMMERCIAL

## 2020-10-16 PROCEDURE — 93798 PHYS/QHP OP CAR RHAB W/ECG: CPT

## 2020-10-19 ENCOUNTER — HOSPITAL ENCOUNTER (OUTPATIENT)
Dept: CARDIAC REHAB | Age: 60
Setting detail: THERAPIES SERIES
Discharge: HOME | End: 2020-10-19
Payer: COMMERCIAL

## 2020-10-19 PROCEDURE — 93798 PHYS/QHP OP CAR RHAB W/ECG: CPT

## 2020-10-21 ENCOUNTER — APPOINTMENT (OUTPATIENT)
Dept: CARDIAC REHAB | Age: 60
End: 2020-10-21
Payer: COMMERCIAL

## 2020-10-23 ENCOUNTER — HOSPITAL ENCOUNTER (OUTPATIENT)
Dept: CARDIAC REHAB | Age: 60
Setting detail: THERAPIES SERIES
Discharge: HOME | End: 2020-10-23
Payer: COMMERCIAL

## 2020-10-23 PROCEDURE — 93798 PHYS/QHP OP CAR RHAB W/ECG: CPT

## 2020-10-26 ENCOUNTER — HOSPITAL ENCOUNTER (OUTPATIENT)
Dept: CARDIAC REHAB | Age: 60
Setting detail: THERAPIES SERIES
Discharge: HOME | End: 2020-10-26
Payer: COMMERCIAL

## 2020-10-26 PROCEDURE — 93798 PHYS/QHP OP CAR RHAB W/ECG: CPT

## 2020-10-27 RX ORDER — CARVEDILOL 25 MG/1
TABLET ORAL
Qty: 60 TABLET | Refills: 6 | Status: SHIPPED | OUTPATIENT
Start: 2020-10-27 | End: 2020-11-04 | Stop reason: SDUPTHER

## 2020-10-28 ENCOUNTER — HOSPITAL ENCOUNTER (OUTPATIENT)
Dept: CARDIAC REHAB | Age: 60
Setting detail: THERAPIES SERIES
Discharge: HOME | End: 2020-10-28
Payer: COMMERCIAL

## 2020-10-28 PROCEDURE — 93798 PHYS/QHP OP CAR RHAB W/ECG: CPT

## 2020-10-30 ENCOUNTER — HOSPITAL ENCOUNTER (OUTPATIENT)
Dept: CARDIAC REHAB | Age: 60
Setting detail: THERAPIES SERIES
Discharge: HOME | End: 2020-10-30
Payer: COMMERCIAL

## 2020-10-30 PROCEDURE — 93798 PHYS/QHP OP CAR RHAB W/ECG: CPT

## 2020-11-02 ENCOUNTER — HOSPITAL ENCOUNTER (OUTPATIENT)
Dept: CARDIAC REHAB | Age: 60
Setting detail: THERAPIES SERIES
Discharge: HOME | End: 2020-11-02
Payer: COMMERCIAL

## 2020-11-03 NOTE — PROGRESS NOTES
[facility]    Con Chavis  1960 November 4, 2020    CC: \"I feel well\"     HPI:  The patient is 56 y.o. female with a past medical history significant for CAD, hyperlipidemia, hypertension and type 2 diabetes. She presented to [facility] with complaints of chest pain and found to have NSTEMI. She underwent heart catheterization on 6/24/20 resulting in SANGEETA x 2 to RCA. She presents today for follow up. Today, she reports feeling well overall. She denies chest pain with rest or exertion. She continues to participate in cardiac rehab and denies exertional symptoms. Her breathing has been comfortable without shortness of breath. Patient denies exertional chest pain/pressure, dyspnea at rest, TRAVIS, PND, orthopnea, palpitations, lightheadedness, weight changes, changes in LE edema, and syncope. She reports medication compliance and is tolerating. Review of Systems:  Constitutional: No fatigue, weakness, night sweats or fever. HEENT: No new vision difficulties or ringing in the ears. Respiratory: No new SOB, PND, orthopnea or cough. Cardiovascular: See HPI   GI: No n/v, diarrhea, constipation, abdominal pain or changes in bowel habits. No melena, no hematochezia  : No urinary frequency, urgency, incontinence, hematuria or dysuria. Skin: No cyanosis or skin lesions. Musculoskeletal: No new muscle or joint pain. Neurological: No syncope or TIA-like symptoms.   Psychiatric: No anxiety, insomnia or depression     Past Medical History:   Diagnosis Date    Allergic rhinitis     Hyperlipidemia     Hypertension     Neuropathy     Type II or unspecified type diabetes mellitus without mention of complication, not stated as uncontrolled      Past Surgical History:   Procedure Laterality Date    ANTERIOR CRUCIATE LIGAMENT REPAIR      PTCA      SANGEETA to RCA    TUBAL LIGATION       Family History   Problem Relation Age of Onset    Arthritis Mother     High Blood Pressure Maternal Grandmother      Social History     Tobacco Use    Smoking status: Former Smoker     Packs/day: 0.25     Years: 5.00     Pack years: 1.25     Types: Cigarettes     Last attempt to quit: 2020     Years since quittin.3    Smokeless tobacco: Never Used   Substance Use Topics    Alcohol use: Yes     Comment: a little wine on holidays    Drug use: No       Allergies   Allergen Reactions    Percocet [Oxycodone-Acetaminophen] Nausea Only     Current Outpatient Medications   Medication Sig Dispense Refill    carvedilol (COREG) 25 MG tablet TAKE ONE TABLET BY MOUTH TWICE A DAY 60 tablet 6    hydroCHLOROthiazide (MICROZIDE) 12.5 MG capsule Take 1 capsule by mouth every morning 30 capsule 5    atorvastatin (LIPITOR) 40 MG tablet Take 1 tablet by mouth nightly 30 tablet 3    ticagrelor (BRILINTA) 90 MG TABS tablet Take 1 tablet by mouth 2 times daily 60 tablet 3    fluticasone (FLONASE) 50 MCG/ACT nasal spray 1 spray by Each Nostril route daily 2 Bottle 1    gabapentin (NEURONTIN) 300 MG capsule Take 300 mg by mouth as needed. 300 mg in AM and 600 mg at Bedtime       losartan (COZAAR) 100 MG tablet Take 100 mg by mouth daily       Multiple Vitamins-Minerals (THERAPEUTIC MULTIVITAMIN-MINERALS) tablet Take 1 tablet by mouth daily      vitamin C (ASCORBIC ACID) 500 MG tablet Take 500 mg by mouth daily      Cholecalciferol (VITAMIN D3) 125 MCG (5000 UT) TABS Take by mouth Pt unsure of dose.  aspirin 81 MG chewable tablet Take 81 mg by mouth daily      metFORMIN (GLUCOPHAGE) 500 MG tablet Take 1 tablet by mouth 2 times daily (with meals). 180 tablet 3     No current facility-administered medications for this visit.         Physical Exam:   /78 (Site: Left Upper Arm, Position: Sitting)   Pulse 66   Temp 97.7 °F (36.5 °C)   Ht 5' 5\" (1.651 m)   Wt 182 lb (82.6 kg)   LMP 2015 Comment: spotting, haven't had periods approx 1 year  SpO2 98%   BMI 30.29 kg/m²   No intake or output data in the 24 hours ending 11/04/20 1529  Wt Readings from Last 2 Encounters:   11/04/20 182 lb (82.6 kg)   08/04/20 179 lb 9.6 oz (81.5 kg)     Constitutional: She is oriented to person, place, and time. She appears well-developed and well-nourished. In no acute distress. Head: Normocephalic and atraumatic. Neck: Neck supple. No JVD present. Carotid bruit is not present. No mass and no thyromegaly present. No lymphadenopathy present. Cardiovascular: Normal rate, regular rhythm, normal heart sounds and intact distal pulses. Exam reveals no gallop and no friction rub. No murmur heard. Pulmonary/Chest: Effort normal and breath sounds normal. No respiratory distress. She has no wheezes, rhonchi or rales. Abdominal: Soft, non-tender. Bowel sounds and aorta are normal. She exhibits no organomegaly, mass or bruit. Extremities: No edema, cyanosis, or clubbing. Pulses are 2+ radial/carotid/dorsalis pedis and posterior tibial bilaterally. Neurological: She is alert and oriented to person, place, and time. She has normal reflexes. No cranial nerve deficit. Coordination normal.   Skin: Skin is warm and dry. There is no rash or diaphoresis. Psychiatric: She has a normal mood and affect. Her speech is normal and behavior is normal.         Procedures:     [facility] 6/24/20  1. Right-dominant coronary arterial system with a 95% proximal to mid  RCA lesion with DAVID 2 flow. This was intervened upon with overlapping  3.5-mm stents. More distal one was 38 mm, the more proximal was 15 mm. These were post dilated to 3.91 mm in diameter. There was 0% residual  stenosis and DAVID 3 flow in the vessel. In the left system, there is no  significant left main, circumflex, or left anterior descending artery  disease. 2.  Low-normal left ventricular systolic function with LV ejection  fraction of 50%. Mild inferior basal to mid hypokinesis. 3.  Normal left ventricular end-diastolic pressure at 5 mmHg.   4.  No gradient across the aortic valve on pullback to suggest aortic  stenosis. Imaging:     Echo 9/17/20  Normal left ventricle size, wall thickness, and systolic function with an  estimated ejection fraction of 55-60%. No regional wall motion abnormalities are seen. The right ventricle is normal in size and function. No significant valve abnormalities noted. Lab Review:   Lab Results   Component Value Date    TRIG 208 06/25/2020    HDL 41 06/25/2020    LDLCALC 115 06/25/2020    LABVLDL 42 06/25/2020     Lab Results   Component Value Date     06/25/2020    K 3.7 06/25/2020    K 3.5 06/24/2020    BUN 12 06/25/2020    CREATININE 0.7 06/25/2020         Assessment:  1. CAD of native coronary arteries without angina  2. Hyperlipidemia with LDL goal <70 mg/dL   3. Essential hypertension     Plan:  I think that Ms. Joe Lara  is entirely stable from a cardiovascular standpoint. I see no need to make any changes currently in her medical regimen. She is not endorsing any symptoms representing angina and her blood pressure is well controlled. I will have her complete a fasting lipid profile in the near future to assess control of her cholesterol with statin therapy. I have congratulated her on quitting smoking and encouraged continued cessation. I will see her in the office for follow up in 7 months. This note was scribed in the presence of Jarret Riggins MD by [employer], RN. Physician Attestation:  The scribes documentation has been prepared under my direction and personally reviewed by me in its entirety. I, Dr. Mary Nguyen personally performed the services described in this documentation as scribed by my RN in my presence, and I confirm that the note above accurately reflects all work, treatment, procedures, and medical decision making performed by me.

## 2020-11-04 ENCOUNTER — OFFICE VISIT (OUTPATIENT)
Dept: CARDIOLOGY CLINIC | Age: 60
End: 2020-11-04
Payer: COMMERCIAL

## 2020-11-04 ENCOUNTER — HOSPITAL ENCOUNTER (OUTPATIENT)
Dept: CARDIAC REHAB | Age: 60
Setting detail: THERAPIES SERIES
Discharge: HOME | End: 2020-11-04
Payer: COMMERCIAL

## 2020-11-04 VITALS
DIASTOLIC BLOOD PRESSURE: 78 MMHG | HEIGHT: 65 IN | OXYGEN SATURATION: 98 % | WEIGHT: 182 LBS | HEART RATE: 66 BPM | TEMPERATURE: 97.7 F | BODY MASS INDEX: 30.32 KG/M2 | SYSTOLIC BLOOD PRESSURE: 130 MMHG

## 2020-11-04 PROCEDURE — G8417 CALC BMI ABV UP PARAM F/U: HCPCS | Performed by: INTERNAL MEDICINE

## 2020-11-04 PROCEDURE — 1036F TOBACCO NON-USER: CPT | Performed by: INTERNAL MEDICINE

## 2020-11-04 PROCEDURE — 3017F COLORECTAL CA SCREEN DOC REV: CPT | Performed by: INTERNAL MEDICINE

## 2020-11-04 PROCEDURE — G8427 DOCREV CUR MEDS BY ELIG CLIN: HCPCS | Performed by: INTERNAL MEDICINE

## 2020-11-04 PROCEDURE — G8484 FLU IMMUNIZE NO ADMIN: HCPCS | Performed by: INTERNAL MEDICINE

## 2020-11-04 PROCEDURE — 93798 PHYS/QHP OP CAR RHAB W/ECG: CPT

## 2020-11-04 PROCEDURE — 99214 OFFICE O/P EST MOD 30 MIN: CPT | Performed by: INTERNAL MEDICINE

## 2020-11-04 RX ORDER — CARVEDILOL 25 MG/1
TABLET ORAL
Qty: 60 TABLET | Refills: 6 | Status: SHIPPED | OUTPATIENT
Start: 2020-11-04 | End: 2020-12-03 | Stop reason: SDUPTHER

## 2020-11-06 ENCOUNTER — HOSPITAL ENCOUNTER (OUTPATIENT)
Dept: CARDIAC REHAB | Age: 60
Setting detail: THERAPIES SERIES
Discharge: HOME | End: 2020-11-06
Payer: COMMERCIAL

## 2020-11-06 PROCEDURE — 93798 PHYS/QHP OP CAR RHAB W/ECG: CPT

## 2020-11-09 ENCOUNTER — HOSPITAL ENCOUNTER (OUTPATIENT)
Dept: CARDIAC REHAB | Age: 60
Setting detail: THERAPIES SERIES
Discharge: HOME | End: 2020-11-09
Payer: COMMERCIAL

## 2020-11-09 PROCEDURE — 93798 PHYS/QHP OP CAR RHAB W/ECG: CPT

## 2020-11-11 ENCOUNTER — HOSPITAL ENCOUNTER (OUTPATIENT)
Dept: CARDIAC REHAB | Age: 60
Setting detail: THERAPIES SERIES
Discharge: HOME | End: 2020-11-11
Payer: COMMERCIAL

## 2020-11-11 PROCEDURE — 93798 PHYS/QHP OP CAR RHAB W/ECG: CPT

## 2020-11-13 ENCOUNTER — HOSPITAL ENCOUNTER (OUTPATIENT)
Dept: CARDIAC REHAB | Age: 60
Setting detail: THERAPIES SERIES
Discharge: HOME | End: 2020-11-13
Payer: COMMERCIAL

## 2020-11-13 PROCEDURE — 93798 PHYS/QHP OP CAR RHAB W/ECG: CPT

## 2020-11-16 ENCOUNTER — HOSPITAL ENCOUNTER (OUTPATIENT)
Dept: CARDIAC REHAB | Age: 60
Setting detail: THERAPIES SERIES
Discharge: HOME | End: 2020-11-16
Payer: COMMERCIAL

## 2020-11-16 PROCEDURE — 93798 PHYS/QHP OP CAR RHAB W/ECG: CPT

## 2020-11-18 ENCOUNTER — HOSPITAL ENCOUNTER (OUTPATIENT)
Dept: CARDIAC REHAB | Age: 60
Setting detail: THERAPIES SERIES
Discharge: HOME | End: 2020-11-18
Payer: COMMERCIAL

## 2020-11-18 PROCEDURE — 93798 PHYS/QHP OP CAR RHAB W/ECG: CPT

## 2020-11-20 ENCOUNTER — HOSPITAL ENCOUNTER (OUTPATIENT)
Dept: CARDIAC REHAB | Age: 60
Setting detail: THERAPIES SERIES
Discharge: HOME | End: 2020-11-20
Payer: COMMERCIAL

## 2020-11-20 PROCEDURE — 93798 PHYS/QHP OP CAR RHAB W/ECG: CPT

## 2020-11-23 ENCOUNTER — HOSPITAL ENCOUNTER (OUTPATIENT)
Dept: CARDIAC REHAB | Age: 60
Setting detail: THERAPIES SERIES
Discharge: HOME | End: 2020-11-23
Payer: COMMERCIAL

## 2020-11-23 PROCEDURE — 93798 PHYS/QHP OP CAR RHAB W/ECG: CPT

## 2020-11-25 ENCOUNTER — HOSPITAL ENCOUNTER (OUTPATIENT)
Dept: CARDIAC REHAB | Age: 60
Setting detail: THERAPIES SERIES
Discharge: HOME | End: 2020-11-25
Payer: COMMERCIAL

## 2020-11-25 PROCEDURE — 93798 PHYS/QHP OP CAR RHAB W/ECG: CPT

## 2020-11-27 ENCOUNTER — APPOINTMENT (OUTPATIENT)
Dept: CARDIAC REHAB | Age: 60
End: 2020-11-27
Payer: COMMERCIAL

## 2020-11-30 ENCOUNTER — APPOINTMENT (OUTPATIENT)
Dept: CARDIAC REHAB | Age: 60
End: 2020-11-30
Payer: COMMERCIAL

## 2020-12-03 RX ORDER — CARVEDILOL 25 MG/1
TABLET ORAL
Qty: 60 TABLET | Refills: 6 | Status: SHIPPED | OUTPATIENT
Start: 2020-12-03 | End: 2021-05-27

## 2020-12-29 NOTE — TELEPHONE ENCOUNTER
Medication Refill    ticagrelor (BRILINTA) 90 MG TABS tablet  Take 1 tablet by mouth 2 times daily    30 days    [address] [facility] [address], [address] [facility]   [address], [address] [place] [address]

## 2021-03-30 RX ORDER — TICAGRELOR 90 MG/1
TABLET ORAL
Qty: 180 TABLET | Refills: 2 | Status: SHIPPED | OUTPATIENT
Start: 2021-03-30

## 2021-03-31 RX ORDER — TICAGRELOR 90 MG/1
TABLET ORAL
Qty: 180 TABLET | Refills: 2 | OUTPATIENT
Start: 2021-03-31

## 2021-05-27 RX ORDER — CARVEDILOL 25 MG/1
TABLET ORAL
Qty: 60 TABLET | Refills: 5 | Status: SHIPPED | OUTPATIENT
Start: 2021-05-27 | End: 2021-12-01

## 2021-06-07 NOTE — PROGRESS NOTES
[facility] [place], operated by [facility]    Tobie Hammans  1960 June 8, 2021    CC: \"I get an ache\"     HPI:  The patient is 59 y.o. female with a past medical history significant for CAD, hyperlipidemia, hypertension and type 2 diabetes. She presented to [facility] with complaints of chest pain and found to have NSTEMI. She underwent heart catheterization on 6/24/20 resulting in SANGEETA x 2 to RCA. She presents today for follow up. She admits to occasional chest discomfort that will present as an \"ache\". The pain will resolve with Tylenol. She has been working part-time and admits to walking often with this. She denies any exertional symptoms including chest pain or shortness of breath. Patient denies exertional chest pain/pressure, dyspnea at rest, TRAVIS, PND, orthopnea, palpitations, lightheadedness, weight changes, changes in LE edema, and syncope. She reports medication compliance and is tolerating. Review of Systems:  Constitutional: No fatigue, weakness, night sweats or fever. HEENT: No new vision difficulties or ringing in the ears. Respiratory: No new SOB, PND, orthopnea or cough. Cardiovascular: See HPI   GI: No n/v, diarrhea, constipation, abdominal pain or changes in bowel habits. No melena, no hematochezia  : No urinary frequency, urgency, incontinence, hematuria or dysuria. Skin: No cyanosis or skin lesions. Musculoskeletal: No new muscle or joint pain. Neurological: No syncope or TIA-like symptoms.   Psychiatric: No anxiety, insomnia or depression     Past Medical History:   Diagnosis Date    Allergic rhinitis     Hyperlipidemia     Hypertension     Neuropathy     Type II or unspecified type diabetes mellitus without mention of complication, not stated as uncontrolled      Past Surgical History:   Procedure Laterality Date    ANTERIOR CRUCIATE LIGAMENT REPAIR      PTCA      SANGEETA to RCA    TUBAL LIGATION       Family History   Problem Relation Age of Onset    Arthritis Mother 182 lb (82.6 kg)     Constitutional: She is oriented to person, place, and time. She appears well-developed and well-nourished. In no acute distress. Head: Normocephalic and atraumatic. Neck: Neck supple. No JVD present. Carotid bruit is not present. No mass and no thyromegaly present. No lymphadenopathy present. Cardiovascular: Normal rate, regular rhythm, normal heart sounds and intact distal pulses. Exam reveals no gallop and no friction rub. No murmur heard. Pulmonary/Chest: Effort normal and breath sounds normal. No respiratory distress. She has no wheezes, rhonchi or rales. Abdominal: Soft, non-tender. Bowel sounds and aorta are normal. She exhibits no organomegaly, mass or bruit. Extremities: No edema, cyanosis, or clubbing. Pulses are 2+ radial/carotid/dorsalis pedis and posterior tibial bilaterally. Neurological: She is alert and oriented to person, place, and time. She has normal reflexes. No cranial nerve deficit. Coordination normal.   Skin: Skin is warm and dry. There is no rash or diaphoresis. Psychiatric: She has a normal mood and affect. Her speech is normal and behavior is normal.         Procedures:     [place] 6/24/20  1. Right-dominant coronary arterial system with a 95% proximal to mid  RCA lesion with DAVID 2 flow. This was intervened upon with overlapping  3.5-mm stents. More distal one was 38 mm, the more proximal was 15 mm. These were post dilated to 3.91 mm in diameter. There was 0% residual  stenosis and DAVID 3 flow in the vessel. In the left system, there is no  significant left main, circumflex, or left anterior descending artery  disease. 2.  Low-normal left ventricular systolic function with LV ejection  fraction of 50%. Mild inferior basal to mid hypokinesis. 3.  Normal left ventricular end-diastolic pressure at 5 mmHg. 4.  No gradient across the aortic valve on pullback to suggest aortic  stenosis.       Imaging:     Echo 9/17/20  Normal left ventricle size, wall thickness, and systolic function with an  estimated ejection fraction of 55-60%. No regional wall motion abnormalities are seen. The right ventricle is normal in size and function. No significant valve abnormalities noted. Lab Review:   Lab Results   Component Value Date    TRIG 208 06/25/2020    HDL 41 06/25/2020    LDLCALC 115 06/25/2020    LABVLDL 42 06/25/2020     Lab Results   Component Value Date     06/25/2020    K 3.7 06/25/2020    K 3.5 06/24/2020    BUN 12 06/25/2020    CREATININE 0.7 06/25/2020         Assessment:  1. CAD of native coronary arteries without angina  2. Hyperlipidemia with LDL goal <70 mg/dL   3. Essential hypertension     Plan:  She is not endorsing any symptoms representing angina and her blood pressure is well controlled. She can discontinue Brilinta at the end of the month after completing 1 year of therapy. She will remain on aspirin 81 mg daily at that time. I will have her complete a fasting lipid profile with AST and ALT today to assess her cholesterol. I have encouraged her to increase her aerobic activity as tolerated and adhere to a heart healthy diet. I will see her in the office for follow up in 6 months. This note was scribed in the presence of Nelda Mccormick MD by [employer], RN. Physician Attestation:  The scribes documentation has been prepared under my direction and personally reviewed by me in its entirety. I, Dr. Yolanda Mariscal personally performed the services described in this documentation as scribed by my RN in my presence, and I confirm that the note above accurately reflects all work, treatment, procedures, and medical decision making performed by me.

## 2021-06-08 ENCOUNTER — OFFICE VISIT (OUTPATIENT)
Dept: CARDIOLOGY CLINIC | Age: 61
End: 2021-06-08
Payer: COMMERCIAL

## 2021-06-08 VITALS
HEART RATE: 55 BPM | WEIGHT: 175 LBS | OXYGEN SATURATION: 98 % | SYSTOLIC BLOOD PRESSURE: 142 MMHG | BODY MASS INDEX: 29.16 KG/M2 | DIASTOLIC BLOOD PRESSURE: 78 MMHG | HEIGHT: 65 IN

## 2021-06-08 DIAGNOSIS — E78.5 HYPERLIPIDEMIA LDL GOAL <70: ICD-10-CM

## 2021-06-08 DIAGNOSIS — I10 ESSENTIAL HYPERTENSION: ICD-10-CM

## 2021-06-08 DIAGNOSIS — I25.10 CORONARY ARTERY DISEASE INVOLVING NATIVE CORONARY ARTERY OF NATIVE HEART WITHOUT ANGINA PECTORIS: Primary | ICD-10-CM

## 2021-06-08 PROCEDURE — G8417 CALC BMI ABV UP PARAM F/U: HCPCS | Performed by: INTERNAL MEDICINE

## 2021-06-08 PROCEDURE — G8427 DOCREV CUR MEDS BY ELIG CLIN: HCPCS | Performed by: INTERNAL MEDICINE

## 2021-06-08 PROCEDURE — 99214 OFFICE O/P EST MOD 30 MIN: CPT | Performed by: INTERNAL MEDICINE

## 2021-06-08 PROCEDURE — 3017F COLORECTAL CA SCREEN DOC REV: CPT | Performed by: INTERNAL MEDICINE

## 2021-06-08 PROCEDURE — 1036F TOBACCO NON-USER: CPT | Performed by: INTERNAL MEDICINE

## 2021-06-08 PROCEDURE — 93000 ELECTROCARDIOGRAM COMPLETE: CPT | Performed by: INTERNAL MEDICINE

## 2021-06-21 DIAGNOSIS — E78.5 HYPERLIPIDEMIA LDL GOAL <70: ICD-10-CM

## 2021-06-21 LAB
ALT SERPL-CCNC: 12 U/L (ref 10–40)
AST SERPL-CCNC: 14 U/L (ref 15–37)
CHOLESTEROL, FASTING: 248 MG/DL (ref 0–199)
HDLC SERPL-MCNC: 43 MG/DL (ref 40–60)
LDL CHOLESTEROL CALCULATED: 161 MG/DL
LDL CHOLESTEROL DIRECT: 166 MG/DL
TRIGLYCERIDE, FASTING: 219 MG/DL (ref 0–150)
VLDLC SERPL CALC-MCNC: 44 MG/DL

## 2021-06-23 ENCOUNTER — TELEPHONE (OUTPATIENT)
Dept: CARDIOLOGY CLINIC | Age: 61
End: 2021-06-23

## 2021-06-23 RX ORDER — ROSUVASTATIN CALCIUM 40 MG/1
40 TABLET, COATED ORAL DAILY
Qty: 90 TABLET | Refills: 1 | Status: SHIPPED | OUTPATIENT
Start: 2021-06-23 | End: 2021-12-08

## 2021-10-15 ENCOUNTER — TELEPHONE (OUTPATIENT)
Dept: CARDIOLOGY CLINIC | Age: 61
End: 2021-10-15

## 2021-10-15 NOTE — TELEPHONE ENCOUNTER
Dr. Lora Ferrell     Please review and advise regarding cardiac clearance. Patient last seen in the office 6/8/21 with hx of CAD and SANGEETA x 2 to RCA on 6/24/20.

## 2021-10-15 NOTE — TELEPHONE ENCOUNTER
Cardiac Risk Assessment message information:     What type of procedure are you having:   Total hysterectomy, bilateral salpingo oophorectomy apical suspension with native tissue,  Posterior anterior repair, mid urethral sleeve     When is your procedure scheduled for:  Needs clearance first     Who is the physician performing your procedure:  Dr. Amelia Putnam    Which medications need to be held for your procedure and for how long:   Aspirin 7 days prior and then resume the day after surgery       Phone Number:  [phone]     Fax number to send the letter:  [phone]    Clinical Staff use:     Cardiologist:  Last Appointment:  Next Appointment:  Are you on any blood thinners:  History of Coronary Artery Disease:  Last stress test:  Last echo:  Device Type and :

## 2021-10-18 NOTE — TELEPHONE ENCOUNTER
Called to speak with patient in regards to cardiac clearance, patient did not answer and voicemail was left. Letter was prepared and faxed successfully to Dr. Abhishek Aviles office. Letter along with fax confirmation has been scanned into patient chart.

## 2021-12-01 RX ORDER — CARVEDILOL 25 MG/1
TABLET ORAL
Qty: 60 TABLET | Refills: 5 | Status: SHIPPED | OUTPATIENT
Start: 2021-12-01 | End: 2022-08-23

## 2021-12-01 NOTE — TELEPHONE ENCOUNTER
Last ov 6/8/21  Pending appt 12/8/21  Last refill 5/27/21 #[id number]  Last labs 6/25/20  Last ekg 6/26/21

## 2021-12-07 NOTE — PROGRESS NOTES
Aliya 79    [facility]  1960 December 8, 2021    CC: \"I feel really well\"     HPI:  The patient is 59 y.o. female with a past medical history significant for CAD, hyperlipidemia, hypertension and type 2 diabetes. She presented to [facility] with complaints of chest pain and found to have NSTEMI. She underwent heart catheterization on 6/24/20 resulting in SANGEETA x 2 to RCA. She presents today for follow up. She reports feeling well overall and denies any cardiac complaints. She reports remaining active with her part-time job and walking often during the day with this. She has not been participating in any regular exercise. Patient denies exertional chest pain/pressure, dyspnea at rest, TRAVIS, PND, orthopnea, palpitations, lightheadedness, weight changes, changes in LE edema, and syncope. She reports medication compliance and is tolerating including rosuvastatin. Review of Systems:  Constitutional: No fatigue, weakness, night sweats or fever. HEENT: No new vision difficulties or ringing in the ears. Respiratory: No new SOB, PND, orthopnea or cough. Cardiovascular: See HPI   GI: No n/v, diarrhea, constipation, abdominal pain or changes in bowel habits. No melena, no hematochezia  : No urinary frequency, urgency, incontinence, hematuria or dysuria. Skin: No cyanosis or skin lesions. Musculoskeletal: No new muscle or joint pain. Neurological: No syncope or TIA-like symptoms.   Psychiatric: No anxiety, insomnia or depression     Past Medical History:   Diagnosis Date    Allergic rhinitis     Hyperlipidemia     Hypertension     Neuropathy     Type II or unspecified type diabetes mellitus without mention of complication, not stated as uncontrolled      Past Surgical History:   Procedure Laterality Date    ANTERIOR CRUCIATE LIGAMENT REPAIR      PTCA      SANGEETA to RCA    TUBAL LIGATION       Family History   Problem Relation Age of Onset    Arthritis Mother     High Blood Pressure Maternal Grandmother      Social History     Tobacco Use    Smoking status: Former Smoker     Packs/day: 0.25     Years: 5.00     Pack years: 1.25     Types: Cigarettes     Quit date: 2020     Years since quittin.4    Smokeless tobacco: Never Used   Vaping Use    Vaping Use: Never used   Substance Use Topics    Alcohol use: Yes     Comment: a little wine on holidays    Drug use: No       Allergies   Allergen Reactions    Percocet [Oxycodone-Acetaminophen] Nausea Only     Current Outpatient Medications   Medication Sig Dispense Refill    carvedilol (COREG) 25 MG tablet TAKE ONE TABLET BY MOUTH TWICE A DAY 60 tablet 5    BRILINTA 90 MG TABS tablet TAKE ONE TABLET BY MOUTH TWICE A  tablet 2    hydroCHLOROthiazide (MICROZIDE) 12.5 MG capsule Take 1 capsule by mouth every morning 30 capsule 5    atorvastatin (LIPITOR) 40 MG tablet Take 1 tablet by mouth nightly 30 tablet 3    fluticasone (FLONASE) 50 MCG/ACT nasal spray 1 spray by Each Nostril route daily 2 Bottle 1    gabapentin (NEURONTIN) 300 MG capsule Take 300 mg by mouth as needed. 300 mg in AM and 600 mg at Bedtime       losartan (COZAAR) 100 MG tablet Take 100 mg by mouth daily       Multiple Vitamins-Minerals (THERAPEUTIC MULTIVITAMIN-MINERALS) tablet Take 1 tablet by mouth daily      aspirin 81 MG chewable tablet Take 81 mg by mouth daily      metFORMIN (GLUCOPHAGE) 500 MG tablet Take 1 tablet by mouth 2 times daily (with meals). 180 tablet 3     No current facility-administered medications for this visit.        Physical Exam:   /84   Pulse 61   Ht 5' 5\" (1.651 m)   Wt 171 lb 12.8 oz (77.9 kg)   LMP 2015 Comment: spotting, haven't had periods approx 1 year  SpO2 99%   BMI 28.59 kg/m²   No intake or output data in the 24 hours ending 21 1354  Wt Readings from Last 2 Encounters:   21 171 lb 12.8 oz (77.9 kg)   21 175 lb (79.4 kg)     Constitutional: She is oriented to person, place, and time. She appears well-developed and well-nourished. In no acute distress. Head: Normocephalic and atraumatic. Neck: Neck supple. No JVD present. Carotid bruit is not present. No mass and no thyromegaly present. No lymphadenopathy present. Cardiovascular: Normal rate, regular rhythm, normal heart sounds and intact distal pulses. Exam reveals no gallop and no friction rub. No murmur heard. Pulmonary/Chest: Effort normal and breath sounds normal. No respiratory distress. She has no wheezes, rhonchi or rales. Abdominal: Soft, non-tender. Bowel sounds and aorta are normal. She exhibits no organomegaly, mass or bruit. Extremities: No edema, cyanosis, or clubbing. Pulses are 2+ radial/carotid/dorsalis pedis and posterior tibial bilaterally. Neurological: She is alert and oriented to person, place, and time. She has normal reflexes. No cranial nerve deficit. Coordination normal.   Skin: Skin is warm and dry. There is no rash or diaphoresis. Psychiatric: She has a normal mood and affect. Her speech is normal and behavior is normal.     Personally reviewed and interpreted   EKG Interpretation 6/8/21: Normal sinus rhythm with normal intervals and axis      Procedures:     [facility] 6/24/20  1. Right-dominant coronary arterial system with a 95% proximal to mid  RCA lesion with DAVID 2 flow. This was intervened upon with overlapping  3.5-mm stents. More distal one was 38 mm, the more proximal was 15 mm. These were post dilated to 3.91 mm in diameter. There was 0% residual  stenosis and DAVID 3 flow in the vessel. In the left system, there is no  significant left main, circumflex, or left anterior descending artery  disease. 2.  Low-normal left ventricular systolic function with LV ejection  fraction of 50%. Mild inferior basal to mid hypokinesis. 3.  Normal left ventricular end-diastolic pressure at 5 mmHg.   4.  No gradient across the aortic valve on pullback to suggest aortic  stenosis. Imaging:     Echo 9/17/20  Normal left ventricle size, wall thickness, and systolic function with an  estimated ejection fraction of 55-60%. No regional wall motion abnormalities are seen. The right ventricle is normal in size and function. No significant valve abnormalities noted. Lab Review:   Lab Results   Component Value Date    TRIG 208 06/25/2020    HDL 43 06/21/2021    LDLCALC 161 06/21/2021    LDLDIRECT 166 06/21/2021    LABVLDL 44 06/21/2021     Lab Results   Component Value Date     06/25/2020    K 3.7 06/25/2020    K 3.5 06/24/2020    BUN 12 06/25/2020    CREATININE 0.7 06/25/2020       Assessment:  1. CAD of native coronary arteries without angina  2. Hyperlipidemia with LDL goal <70 mg/dL   3. Essential hypertension     Plan:  She is not endorsing any symptoms representing angina and her blood pressure is well controlled today in the office. She reports compliance with her statin therapy and I will have her repeat a fasting lipid profile with AST and ALT in the next few weeks. I have encouraged her to increase her aerobic activity as tolerated and adhere to a heart healthy diet. I have personally reviewed all previous testing for this visit today including imaging, lab results and EKG as detailed above. I will see her in the office for follow up in 1 year. This note was scribed in the presence of Laura Durán MD by [employer], RN. Physician Attestation:  The scribes documentation has been prepared under my direction and personally reviewed by me in its entirety. I, Dr. Buena Merlin personally performed the services described in this documentation as scribed by my RN in my presence, and I confirm that the note above accurately reflects all work, treatment, procedures, and medical decision making performed by me.

## 2021-12-08 ENCOUNTER — OFFICE VISIT (OUTPATIENT)
Dept: CARDIOLOGY CLINIC | Age: 61
End: 2021-12-08
Payer: COMMERCIAL

## 2021-12-08 VITALS
WEIGHT: 171.8 LBS | HEART RATE: 61 BPM | DIASTOLIC BLOOD PRESSURE: 84 MMHG | BODY MASS INDEX: 28.62 KG/M2 | HEIGHT: 65 IN | SYSTOLIC BLOOD PRESSURE: 138 MMHG | OXYGEN SATURATION: 99 %

## 2021-12-08 DIAGNOSIS — I25.10 CORONARY ARTERY DISEASE INVOLVING NATIVE CORONARY ARTERY OF NATIVE HEART WITHOUT ANGINA PECTORIS: Primary | ICD-10-CM

## 2021-12-08 DIAGNOSIS — I10 ESSENTIAL HYPERTENSION: ICD-10-CM

## 2021-12-08 DIAGNOSIS — E78.5 HYPERLIPIDEMIA LDL GOAL <70: ICD-10-CM

## 2021-12-08 PROCEDURE — G8484 FLU IMMUNIZE NO ADMIN: HCPCS | Performed by: INTERNAL MEDICINE

## 2021-12-08 PROCEDURE — 99214 OFFICE O/P EST MOD 30 MIN: CPT | Performed by: INTERNAL MEDICINE

## 2021-12-08 PROCEDURE — G8417 CALC BMI ABV UP PARAM F/U: HCPCS | Performed by: INTERNAL MEDICINE

## 2021-12-08 PROCEDURE — 1036F TOBACCO NON-USER: CPT | Performed by: INTERNAL MEDICINE

## 2021-12-08 PROCEDURE — 3017F COLORECTAL CA SCREEN DOC REV: CPT | Performed by: INTERNAL MEDICINE

## 2021-12-08 PROCEDURE — G8427 DOCREV CUR MEDS BY ELIG CLIN: HCPCS | Performed by: INTERNAL MEDICINE

## 2021-12-08 RX ORDER — ROSUVASTATIN CALCIUM 40 MG/1
40 TABLET, COATED ORAL DAILY
Qty: 90 TABLET | Refills: 1
Start: 2021-12-08 | End: 2022-01-03

## 2022-01-03 RX ORDER — ROSUVASTATIN CALCIUM 40 MG/1
TABLET, COATED ORAL
Qty: 90 TABLET | Refills: 3 | Status: SHIPPED | OUTPATIENT
Start: 2022-01-03

## 2022-01-03 NOTE — TELEPHONE ENCOUNTER
Last OV: 12/8/21  Next OV: 1 yr f/u 12/2022  Last refill:12/8/21  Most recent Labs: 6/21/21  Last EKG (if needed):6/26/21

## 2022-02-05 ENCOUNTER — APPOINTMENT (OUTPATIENT)
Dept: CT IMAGING | Age: 62
End: 2022-02-05
Payer: COMMERCIAL

## 2022-02-05 ENCOUNTER — HOSPITAL ENCOUNTER (OUTPATIENT)
Age: 62
Setting detail: OBSERVATION
Discharge: HOME | End: 2022-02-07
Attending: EMERGENCY MEDICINE | Admitting: INTERNAL MEDICINE
Payer: COMMERCIAL

## 2022-02-05 ENCOUNTER — APPOINTMENT (OUTPATIENT)
Dept: GENERAL RADIOLOGY | Age: 62
End: 2022-02-05
Payer: COMMERCIAL

## 2022-02-05 DIAGNOSIS — R07.9 CHEST PAIN, UNSPECIFIED TYPE: Primary | ICD-10-CM

## 2022-02-05 DIAGNOSIS — N17.9 AKI (ACUTE KIDNEY INJURY) (HCC): ICD-10-CM

## 2022-02-05 DIAGNOSIS — D64.9 ANEMIA, UNSPECIFIED TYPE: ICD-10-CM

## 2022-02-05 LAB
ANION GAP SERPL CALCULATED.3IONS-SCNC: 12 MMOL/L (ref 3–16)
BASOPHILS ABSOLUTE: 0 K/UL (ref 0–0.2)
BASOPHILS RELATIVE PERCENT: 0.7 %
BUN BLDV-MCNC: 18 MG/DL (ref 7–20)
CALCIUM SERPL-MCNC: 9.3 MG/DL (ref 8.3–10.6)
CHLORIDE BLD-SCNC: 102 MMOL/L (ref 99–110)
CO2: 26 MMOL/L (ref 21–32)
CREAT SERPL-MCNC: 1.4 MG/DL (ref 0.6–1.2)
EOSINOPHILS ABSOLUTE: 0.1 K/UL (ref 0–0.6)
EOSINOPHILS RELATIVE PERCENT: 1.7 %
GFR AFRICAN AMERICAN: 46
GFR NON-AFRICAN AMERICAN: 38
GLUCOSE BLD-MCNC: 112 MG/DL (ref 70–99)
GLUCOSE BLD-MCNC: 145 MG/DL (ref 70–99)
HCT VFR BLD CALC: 32.9 % (ref 36–48)
HEMOGLOBIN: 10.7 G/DL (ref 12–16)
LYMPHOCYTES ABSOLUTE: 1.9 K/UL (ref 1–5.1)
LYMPHOCYTES RELATIVE PERCENT: 32.3 %
MAGNESIUM: 2 MG/DL (ref 1.8–2.4)
MCH RBC QN AUTO: 28.3 PG (ref 26–34)
MCHC RBC AUTO-ENTMCNC: 32.5 G/DL (ref 31–36)
MCV RBC AUTO: 86.9 FL (ref 80–100)
MONOCYTES ABSOLUTE: 0.4 K/UL (ref 0–1.3)
MONOCYTES RELATIVE PERCENT: 7.1 %
NEUTROPHILS ABSOLUTE: 3.5 K/UL (ref 1.7–7.7)
NEUTROPHILS RELATIVE PERCENT: 58.2 %
PDW BLD-RTO: 15.8 % (ref 12.4–15.4)
PERFORMED ON: ABNORMAL
PLATELET # BLD: 167 K/UL (ref 135–450)
PMV BLD AUTO: 8.1 FL (ref 5–10.5)
POTASSIUM REFLEX MAGNESIUM: 3.5 MMOL/L (ref 3.5–5.1)
PRO-BNP: 189 PG/ML (ref 0–124)
PRO-BNP: 246 PG/ML (ref 0–124)
RBC # BLD: 3.78 M/UL (ref 4–5.2)
SODIUM BLD-SCNC: 140 MMOL/L (ref 136–145)
TOTAL CK: 61 U/L (ref 26–192)
TROPONIN: <0.01 NG/ML
TROPONIN: <0.01 NG/ML
WBC # BLD: 6 K/UL (ref 4–11)

## 2022-02-05 PROCEDURE — 2580000003 HC RX 258: Performed by: INTERNAL MEDICINE

## 2022-02-05 PROCEDURE — 6360000004 HC RX CONTRAST MEDICATION: Performed by: NURSE PRACTITIONER

## 2022-02-05 PROCEDURE — 83036 HEMOGLOBIN GLYCOSYLATED A1C: CPT

## 2022-02-05 PROCEDURE — 84484 ASSAY OF TROPONIN QUANT: CPT

## 2022-02-05 PROCEDURE — 96374 THER/PROPH/DIAG INJ IV PUSH: CPT

## 2022-02-05 PROCEDURE — 2580000003 HC RX 258: Performed by: NURSE PRACTITIONER

## 2022-02-05 PROCEDURE — 6370000000 HC RX 637 (ALT 250 FOR IP): Performed by: NURSE PRACTITIONER

## 2022-02-05 PROCEDURE — 96360 HYDRATION IV INFUSION INIT: CPT

## 2022-02-05 PROCEDURE — 93005 ELECTROCARDIOGRAM TRACING: CPT | Performed by: EMERGENCY MEDICINE

## 2022-02-05 PROCEDURE — 80048 BASIC METABOLIC PNL TOTAL CA: CPT

## 2022-02-05 PROCEDURE — 71046 X-RAY EXAM CHEST 2 VIEWS: CPT

## 2022-02-05 PROCEDURE — G0378 HOSPITAL OBSERVATION PER HR: HCPCS

## 2022-02-05 PROCEDURE — 36415 COLL VENOUS BLD VENIPUNCTURE: CPT

## 2022-02-05 PROCEDURE — 83880 ASSAY OF NATRIURETIC PEPTIDE: CPT

## 2022-02-05 PROCEDURE — 83735 ASSAY OF MAGNESIUM: CPT

## 2022-02-05 PROCEDURE — 96361 HYDRATE IV INFUSION ADD-ON: CPT

## 2022-02-05 PROCEDURE — 82550 ASSAY OF CK (CPK): CPT

## 2022-02-05 PROCEDURE — 99285 EMERGENCY DEPT VISIT HI MDM: CPT

## 2022-02-05 PROCEDURE — 6370000000 HC RX 637 (ALT 250 FOR IP): Performed by: INTERNAL MEDICINE

## 2022-02-05 PROCEDURE — 85025 COMPLETE CBC W/AUTO DIFF WBC: CPT

## 2022-02-05 PROCEDURE — 6360000002 HC RX W HCPCS: Performed by: INTERNAL MEDICINE

## 2022-02-05 PROCEDURE — 71260 CT THORAX DX C+: CPT

## 2022-02-05 RX ORDER — HYDROCHLOROTHIAZIDE 12.5 MG/1
12.5 CAPSULE, GELATIN COATED ORAL EVERY MORNING
Status: DISCONTINUED | OUTPATIENT
Start: 2022-02-06 | End: 2022-02-05

## 2022-02-05 RX ORDER — ONDANSETRON 2 MG/ML
4 INJECTION INTRAMUSCULAR; INTRAVENOUS EVERY 6 HOURS PRN
Status: DISCONTINUED | OUTPATIENT
Start: 2022-02-05 | End: 2022-02-07 | Stop reason: HOSPADM

## 2022-02-05 RX ORDER — PROMETHAZINE HYDROCHLORIDE 25 MG/1
12.5 TABLET ORAL EVERY 6 HOURS PRN
Status: DISCONTINUED | OUTPATIENT
Start: 2022-02-05 | End: 2022-02-07 | Stop reason: HOSPADM

## 2022-02-05 RX ORDER — ASPIRIN 81 MG/1
81 TABLET, CHEWABLE ORAL DAILY
Status: DISCONTINUED | OUTPATIENT
Start: 2022-02-06 | End: 2022-02-07 | Stop reason: HOSPADM

## 2022-02-05 RX ORDER — NICOTINE POLACRILEX 4 MG
15 LOZENGE BUCCAL PRN
Status: DISCONTINUED | OUTPATIENT
Start: 2022-02-05 | End: 2022-02-07 | Stop reason: HOSPADM

## 2022-02-05 RX ORDER — SODIUM CHLORIDE 0.9 % (FLUSH) 0.9 %
10 SYRINGE (ML) INJECTION PRN
Status: DISCONTINUED | OUTPATIENT
Start: 2022-02-05 | End: 2022-02-07 | Stop reason: HOSPADM

## 2022-02-05 RX ORDER — POTASSIUM CHLORIDE 7.45 MG/ML
10 INJECTION INTRAVENOUS PRN
Status: DISCONTINUED | OUTPATIENT
Start: 2022-02-05 | End: 2022-02-07 | Stop reason: HOSPADM

## 2022-02-05 RX ORDER — GABAPENTIN 300 MG/1
300 CAPSULE ORAL NIGHTLY
Status: DISCONTINUED | OUTPATIENT
Start: 2022-02-05 | End: 2022-02-07 | Stop reason: HOSPADM

## 2022-02-05 RX ORDER — SODIUM CHLORIDE 0.9 % (FLUSH) 0.9 %
10 SYRINGE (ML) INJECTION EVERY 12 HOURS SCHEDULED
Status: DISCONTINUED | OUTPATIENT
Start: 2022-02-05 | End: 2022-02-07 | Stop reason: HOSPADM

## 2022-02-05 RX ORDER — MAGNESIUM SULFATE IN WATER 40 MG/ML
2000 INJECTION, SOLUTION INTRAVENOUS PRN
Status: DISCONTINUED | OUTPATIENT
Start: 2022-02-05 | End: 2022-02-07 | Stop reason: HOSPADM

## 2022-02-05 RX ORDER — DEXTROSE MONOHYDRATE 25 G/50ML
12.5 INJECTION, SOLUTION INTRAVENOUS PRN
Status: DISCONTINUED | OUTPATIENT
Start: 2022-02-05 | End: 2022-02-07 | Stop reason: HOSPADM

## 2022-02-05 RX ORDER — ROSUVASTATIN CALCIUM 20 MG/1
40 TABLET, COATED ORAL DAILY
Status: DISCONTINUED | OUTPATIENT
Start: 2022-02-06 | End: 2022-02-07 | Stop reason: HOSPADM

## 2022-02-05 RX ORDER — ASPIRIN 81 MG/1
243 TABLET, CHEWABLE ORAL ONCE
Status: COMPLETED | OUTPATIENT
Start: 2022-02-05 | End: 2022-02-05

## 2022-02-05 RX ORDER — 0.9 % SODIUM CHLORIDE 0.9 %
1000 INTRAVENOUS SOLUTION INTRAVENOUS ONCE
Status: COMPLETED | OUTPATIENT
Start: 2022-02-05 | End: 2022-02-05

## 2022-02-05 RX ORDER — LOSARTAN POTASSIUM 100 MG/1
100 TABLET ORAL DAILY
Status: DISCONTINUED | OUTPATIENT
Start: 2022-02-05 | End: 2022-02-05

## 2022-02-05 RX ORDER — ACETAMINOPHEN 650 MG/1
650 SUPPOSITORY RECTAL EVERY 6 HOURS PRN
Status: DISCONTINUED | OUTPATIENT
Start: 2022-02-05 | End: 2022-02-07 | Stop reason: HOSPADM

## 2022-02-05 RX ORDER — POTASSIUM CHLORIDE 20 MEQ/1
40 TABLET, EXTENDED RELEASE ORAL PRN
Status: DISCONTINUED | OUTPATIENT
Start: 2022-02-05 | End: 2022-02-07 | Stop reason: HOSPADM

## 2022-02-05 RX ORDER — CARVEDILOL 25 MG/1
25 TABLET ORAL 2 TIMES DAILY WITH MEALS
Status: DISCONTINUED | OUTPATIENT
Start: 2022-02-05 | End: 2022-02-06

## 2022-02-05 RX ORDER — SODIUM CHLORIDE 9 MG/ML
25 INJECTION, SOLUTION INTRAVENOUS PRN
Status: DISCONTINUED | OUTPATIENT
Start: 2022-02-05 | End: 2022-02-07 | Stop reason: HOSPADM

## 2022-02-05 RX ORDER — DEXTROSE MONOHYDRATE 50 MG/ML
100 INJECTION, SOLUTION INTRAVENOUS PRN
Status: DISCONTINUED | OUTPATIENT
Start: 2022-02-05 | End: 2022-02-07 | Stop reason: HOSPADM

## 2022-02-05 RX ORDER — NITROGLYCERIN 0.4 MG/1
0.4 TABLET SUBLINGUAL EVERY 5 MIN PRN
Status: DISCONTINUED | OUTPATIENT
Start: 2022-02-05 | End: 2022-02-07 | Stop reason: HOSPADM

## 2022-02-05 RX ORDER — ACETAMINOPHEN 325 MG/1
650 TABLET ORAL EVERY 6 HOURS PRN
Status: DISCONTINUED | OUTPATIENT
Start: 2022-02-05 | End: 2022-02-07 | Stop reason: HOSPADM

## 2022-02-05 RX ORDER — M-VIT,TX,IRON,MINS/CALC/FOLIC 27MG-0.4MG
1 TABLET ORAL DAILY
Status: DISCONTINUED | OUTPATIENT
Start: 2022-02-06 | End: 2022-02-07 | Stop reason: HOSPADM

## 2022-02-05 RX ORDER — FLUTICASONE PROPIONATE 50 MCG
1 SPRAY, SUSPENSION (ML) NASAL DAILY
Status: DISCONTINUED | OUTPATIENT
Start: 2022-02-06 | End: 2022-02-07 | Stop reason: HOSPADM

## 2022-02-05 RX ORDER — AMLODIPINE BESYLATE 5 MG/1
5 TABLET ORAL DAILY
Status: DISCONTINUED | OUTPATIENT
Start: 2022-02-05 | End: 2022-02-07 | Stop reason: HOSPADM

## 2022-02-05 RX ADMIN — CARVEDILOL 25 MG: 25 TABLET, FILM COATED ORAL at 21:26

## 2022-02-05 RX ADMIN — ASPIRIN 81 MG 243 MG: 81 TABLET ORAL at 14:26

## 2022-02-05 RX ADMIN — NITROGLYCERIN 0.4 MG: 0.4 TABLET, ORALLY DISINTEGRATING SUBLINGUAL at 14:26

## 2022-02-05 RX ADMIN — ACETAMINOPHEN 650 MG: 325 TABLET ORAL at 20:02

## 2022-02-05 RX ADMIN — ONDANSETRON 4 MG: 2 INJECTION INTRAMUSCULAR; INTRAVENOUS at 21:43

## 2022-02-05 RX ADMIN — SODIUM CHLORIDE 1000 ML: 9 INJECTION, SOLUTION INTRAVENOUS at 14:56

## 2022-02-05 RX ADMIN — IOPAMIDOL 75 ML: 755 INJECTION, SOLUTION INTRAVENOUS at 15:13

## 2022-02-05 RX ADMIN — INSULIN LISPRO 2 UNITS: 100 INJECTION, SOLUTION INTRAVENOUS; SUBCUTANEOUS at 22:06

## 2022-02-05 RX ADMIN — AMLODIPINE BESYLATE 5 MG: 5 TABLET ORAL at 21:26

## 2022-02-05 RX ADMIN — SODIUM CHLORIDE, PRESERVATIVE FREE 10 ML: 5 INJECTION INTRAVENOUS at 21:27

## 2022-02-05 RX ADMIN — TICAGRELOR 90 MG: 90 TABLET ORAL at 21:26

## 2022-02-05 RX ADMIN — GABAPENTIN 300 MG: 300 CAPSULE ORAL at 21:26

## 2022-02-05 ASSESSMENT — PAIN SCALES - GENERAL
PAINLEVEL_OUTOF10: 5
PAINLEVEL_OUTOF10: 5
PAINLEVEL_OUTOF10: 8
PAINLEVEL_OUTOF10: 4

## 2022-02-05 ASSESSMENT — PAIN DESCRIPTION - PAIN TYPE
TYPE: ACUTE PAIN
TYPE: ACUTE PAIN

## 2022-02-05 ASSESSMENT — PAIN DESCRIPTION - DESCRIPTORS: DESCRIPTORS: BURNING

## 2022-02-05 ASSESSMENT — HEART SCORE: ECG: 0

## 2022-02-05 ASSESSMENT — PAIN DESCRIPTION - LOCATION: LOCATION: CHEST

## 2022-02-05 ASSESSMENT — PAIN DESCRIPTION - ORIENTATION: ORIENTATION: LEFT

## 2022-02-05 ASSESSMENT — PAIN DESCRIPTION - PROGRESSION: CLINICAL_PROGRESSION: GRADUALLY IMPROVING

## 2022-02-05 NOTE — ED PROVIDER NOTES
[address] [facility]        Pt Name: Yesenia Suarez  MRN: [id number]  [place] 1960  Date of evaluation: 2/5/2022  Provider: NATY Yancey - KELECHI  PCP: Arianna Woods MD  Note Started: 2:35 PM EST        I have seen and evaluated this patient with my supervising physician Dr. Karan Escobar       Chief Complaint   Patient presents with    Chest Pain     Pt c/o burning left sided chest pain that radiates to the back since 1 am. Cardiac hx. HISTORY OF PRESENT ILLNESS   (Location, Timing/Onset, Context/Setting, Quality, Duration, Modifying Factors, Severity, Associated Signs and Symptoms)  Note limiting factors. Chief Complaint: Chest pain    Yesenia Suarez is a 57 y.o. female with a medical history of HTN, HLD, DM type II, neuropathy, allergic rhinitis, NSTEMI who presents emergency department with complaints of acute left-sided chest pain that occurred at 1 AM yesterday. Patient states the pain has been intermittent tightness and is currently an 8/10. She denies any aggravating or alleviating factors. She did have some associated burning into her mid chest wall. She also notes pain has started to radiate into her left shoulder and left upper extremity. She has history of an MI in 2020 and was seen by Dr. Ailyn Ybarra PCI with 2 stents. She was concerned as this felt similar to her prior NSTEMI and therefore presented to the emergency department. She did take 1 aspirin 81 mg prior to arrival without relief of symptoms. She denies taking any nitroglycerin at home for the symptoms. She denies any associated nausea, vomiting, diarrhea, cough, wheezing, headache, neck pain, back pain, lightheaded, dizzy, leg swelling, rashes, fevers, or syncope. Former smoker, occasional alcohol use, denies street drugs or IVDA. She does have a significant family history of CAD.     Nursing Notes were all reviewed and agreed with or any disagreements were addressed in the HPI. Review of medical records:  [address] [place] [address] 6/24/20  1.  Right-dominant coronary arterial system with a 95% proximal to mid  RCA lesion with DAVID 2 flow.  This was intervened upon with overlapping  3.5-mm stents.  More distal one was 38 mm, the more proximal was 15 mm. These were post dilated to 3.91 mm in diameter.  There was 0% residual  stenosis and DAVID 3 flow in the vessel.  In the left system, there is no  significant left main, circumflex, or left anterior descending artery  disease. 2.  Low-normal left ventricular systolic function with LV ejection  fraction of 50%.  Mild inferior basal to mid hypokinesis. 3.  Normal left ventricular end-diastolic pressure at 5 mmHg. 4.  No gradient across the aortic valve on pullback to suggest aortic  stenosis.       Imaging:     Echo 9/17/20  Normal left ventricle size, wall thickness, and systolic function with an  estimated ejection fraction of 55-60%. No regional wall motion abnormalities are seen. The right ventricle is normal in size and function. No significant valve abnormalities noted. REVIEW OF SYSTEMS    (2-9 systems for level 4, 10 or more for level 5)     Review of Systems    Positives and Pertinent negatives as per HPI. Except as noted above in the ROS, all other systems were reviewed and negative.        PAST MEDICAL HISTORY     Past Medical History:   Diagnosis Date    Allergic rhinitis     Hyperlipidemia     Hypertension     Neuropathy     Type II or unspecified type diabetes mellitus without mention of complication, not stated as uncontrolled          SURGICAL HISTORY     Past Surgical History:   Procedure Laterality Date    ANTERIOR CRUCIATE LIGAMENT REPAIR      PTCA      SANGEETA to RCA    TUBAL LIGATION           CURRENTMEDICATIONS       Previous Medications    ASPIRIN 81 MG CHEWABLE TABLET    Take 81 mg by mouth daily    BRILINTA 90 MG TABS TABLET    TAKE ONE TABLET BY MOUTH TWICE A DAY CARVEDILOL (COREG) 25 MG TABLET    TAKE ONE TABLET BY MOUTH TWICE A DAY    FLUTICASONE (FLONASE) 50 MCG/ACT NASAL SPRAY    1 spray by Each Nostril route daily    GABAPENTIN (NEURONTIN) 300 MG CAPSULE    Take 300 mg by mouth as needed. 300 mg in AM and 600 mg at Bedtime     HYDROCHLOROTHIAZIDE (MICROZIDE) 12.5 MG CAPSULE    Take 1 capsule by mouth every morning    LOSARTAN (COZAAR) 100 MG TABLET    Take 100 mg by mouth daily     METFORMIN (GLUCOPHAGE) 500 MG TABLET    Take 1 tablet by mouth 2 times daily (with meals). MULTIPLE VITAMINS-MINERALS (THERAPEUTIC MULTIVITAMIN-MINERALS) TABLET    Take 1 tablet by mouth daily    ROSUVASTATIN (CRESTOR) 40 MG TABLET    TAKE ONE TABLET BY MOUTH DAILY         ALLERGIES     Percocet [oxycodone-acetaminophen]    FAMILYHISTORY       Family History   Problem Relation Age of Onset    Arthritis Mother     High Blood Pressure Maternal Grandmother           SOCIAL HISTORY       Social History     Tobacco Use    Smoking status: Former Smoker     Packs/day: 0.25     Years: 5.00     Pack years: 1.25     Types: Cigarettes     Quit date: 2020     Years since quittin.6    Smokeless tobacco: Never Used   Vaping Use    Vaping Use: Never used   Substance Use Topics    Alcohol use: Yes     Comment: a little wine on holidays    Drug use: No       SCREENINGS      Heart Score for chest pain patients  History:  Moderately Suspicious  ECG: Normal  Patient Age: > 39 and < 65 years  *Risk factors for Atherosclerotic disease: Diabetes Mellitus,Coronary Artery Disease,Positive family History,Hypercholesterolemia,Hypertension  Risk Factors: > 3 Risk factors or history of atherosclerotic disease*  Troponin: < 1X normal limit  Heart Score Total: 4      PHYSICAL EXAM    (up to 7 for level 4, 8 or more for level 5)     ED Triage Vitals [22 1338]   BP Temp Temp Source Pulse Resp SpO2 Height Weight   (!) 155/78 98.7 °F (37.1 °C) Oral 66 18 98 % 5' 5\" (1.651 m) 169 lb 1.6 oz (76.7 kg) Physical Exam  Vitals and nursing note reviewed. Constitutional:       General: She is awake. Appearance: Normal appearance. She is well-developed and overweight. HENT:      Head: Normocephalic and atraumatic. Nose: Nose normal.   Eyes:      General:         Right eye: No discharge. Left eye: No discharge. Cardiovascular:      Rate and Rhythm: Normal rate and regular rhythm. Pulses:           Radial pulses are 2+ on the right side and 2+ on the left side. Heart sounds: Normal heart sounds. Pulmonary:      Effort: Pulmonary effort is normal. No respiratory distress. Breath sounds: Normal breath sounds. Chest:      Chest wall: No tenderness. Abdominal:      General: Bowel sounds are normal.      Palpations: Abdomen is soft. Tenderness: There is no abdominal tenderness. Musculoskeletal:         General: Normal range of motion. Cervical back: Normal range of motion. Right lower leg: No edema. Left lower leg: No edema. Skin:     General: Skin is warm and dry. Coloration: Skin is not pale. Neurological:      Mental Status: She is alert and oriented to person, place, and time. Psychiatric:         Behavior: Behavior normal. Behavior is cooperative.          DIAGNOSTIC RESULTS   LABS:    Labs Reviewed   CBC WITH AUTO DIFFERENTIAL - Abnormal; Notable for the following components:       Result Value    RBC 3.78 (*)     Hemoglobin 10.7 (*)     Hematocrit 32.9 (*)     RDW 15.8 (*)     All other components within normal limits    Narrative:     Performed at:  [address] [address]   Phone [phone]   BASIC METABOLIC PANEL W/ REFLEX TO MG FOR LOW K - Abnormal; Notable for the following components:    Glucose 112 (*)     CREATININE 1.4 (*)     GFR Non- 38 (*)     GFR  46 (*)     All other components within normal limits    Narrative:     Performed at:  [facility]  [address] [place] [address]   Phone [phone]   BRAIN NATRIURETIC PEPTIDE - Abnormal; Notable for the following components:    Pro- (*)     All other components within normal limits    Narrative:     Performed at:  [facility]  [address] [place] [address]   Phone [phone]   TROPONIN    Narrative:     Performed at:  [facility]  [address] [facility] [place] [address]   Phone [phone]   MAGNESIUM    Narrative:     Performed at:  [facility]  [address] [facility] [place] [address]   Phone [phone]   [address]       When ordered only abnormal lab results are displayed. All other labs were within normal range or not returned as of this dictation. EKG: When ordered, EKG's are interpreted by the Emergency Department Physician in the absence of a cardiologist.  Please see their note for interpretation of EKG. RADIOLOGY:   Non-plain film images such as CT, Ultrasound and MRI are read by the radiologist. Plain radiographic images are visualized and preliminarily interpreted by the ED Provider with the below findings:        Interpretation per the Radiologist below, if available at the time of this note:    CT CHEST PULMONARY EMBOLISM W CONTRAST   Preliminary Result   No evidence of pulmonary embolism or acute pulmonary abnormality. XR CHEST (2 VW)   Final Result   No acute process.            XR CHEST (2 VW)    Result Date: 2/5/2022  EXAMINATION: TWO XRAY VIEWS OF THE CHEST 2/5/2022 2:17 pm COMPARISON: 06/23/2020 HISTORY: ORDERING SYSTEM PROVIDED HISTORY: Chest Pain TECHNOLOGIST PROVIDED HISTORY: Reason for exam:->Chest Pain Reason for Exam: Chest Pain (Pt c/o burning left sided chest pain that radiates to the back since 1 am. Cardiac hx. ) Relevant Medical/Surgical History: Diabetic FINDINGS: The lungs are without acute focal process. There is no effusion or pneumothorax. The cardiomediastinal silhouette is stable. The osseous structures are stable. No acute process. PROCEDURES   Unless otherwise noted below, none     Procedures    CRITICAL CARE TIME       CONSULTS:  IP CONSULT TO HOSPITALIST  IP CONSULT TO CARDIOLOGY      EMERGENCY DEPARTMENT COURSE and DIFFERENTIAL DIAGNOSIS/MDM:   Vitals:    Vitals:    02/05/22 1432 02/05/22 1445 02/05/22 1500 02/05/22 1615   BP: 124/77 (!) 143/67 (!) 152/64 (!) 146/69   Pulse: 74 63 61 61   Resp: 16 15 14 17   Temp:       TempSrc:       SpO2: 97% 94% 98% 98%   Weight:       Height:           Patient was given the following medications:  Medications   nitroGLYCERIN (NITROSTAT) SL tablet 0.4 mg (0.4 mg SubLINGual Given 2/5/22 1426)   aspirin chewable tablet 243 mg (243 mg Oral Given 2/5/22 1426)   0.9 % sodium chloride bolus (1,000 mLs IntraVENous New Bag 2/5/22 1456)   iopamidol (ISOVUE-370) 76 % injection 75 mL (75 mLs IntraVENous Given 2/5/22 1513)           Care of this patient took place during the COVID-19 pandemic emergency. ED COURSE & MEDICAL DECISION MAKING    - The patient presented to the ER with complaints of  chest pain. Vital signs were reviewed. Exam well-developed, well-nourished female who appears uncomfortable. Peripheral IV placed. Labs, Imaging ordered. - Pertinent Labs & Imaging studies reviewed. (See chart for details)   -  Patient seen and evaluated in the emergency department. -  Triage and nursing notes reviewed and incorporated.   -  Old chart records reviewed and incorporated.  -  Dr. Herman Ohara emergency department attending assessed the patient  -  Differential diagnosis includes: ACS, MI, costochondritis, pleurisy, aneurysm, dissection, bronchitis, pneumonia, pleural effusion, CHF, cardiomegaly, esophageal rupture, endocarditis, pericarditis, PE, pneumothorax, tamponade versus COVID-19  -  Work-up included: See above  -  ED treatment included:   Normal saline, aspirin, nitroglycerin  -  Results discussed with patient. Payam Chance is a 40-year-old female with complaints of acute onset of left sided chest pain at 1 AM yesterday. Pain radiates around to her left chest and into her left shoulder. She has a history of an MI in 2020 and received 2 cardiac stents. She states the pain had felt similar whenever her MI started and this prompted her ED visit. Heart score 4. On exam patient lungs clear to auscultate. Abdomen soft nontender x4 quadrants. Vital signs stable. No chest wall tenderness reproducible. No bilateral lower extremity edema. Heart sounds normal.  Lab work and imaging is obtained. CBC with RBC 3.78, hemoglobin 10.7, hematocrit 32.9, RDW 15.8. BMP with glucose 112, creatinine 1.4. Troponin negative. . Magnesium 2. Chest x-ray shows no acute process. CT chest is no evidence of pulmonary embolism or acute pulmonary abnormality. Hospitalist Dr. Niranjan Nguyen was consulted. Consult placed to cardiology. Suggested to admit the patient for serial troponins and then can follow-up in the office as needed. Patient was informed these results and need for admission for further testing and treatment. She is agreeable to plan of care. CRITICAL CARE TIME   Total Critical Care time was 35 minutes, excluding separately reportable procedures. There was a high probability of clinically significant/life threatening deterioration in the patient's condition which required my urgent intervention. FINAL IMPRESSION      1. Chest pain, unspecified type    2. STAN (acute kidney injury) ([facility] [address]    3.  Anemia, unspecified type          DISPOSITION/PLAN   DISPOSITION    Admission         (Please note that portions of this note were completed with a voice recognition program.  Efforts were made to edit the dictations but occasionally words are mis-transcribed.)    Db Gould, APRN - CNP (electronically signed)            NATY Joe - CNP  02/05/22 4254

## 2022-02-05 NOTE — ED NOTES
Bed: E-40  Expected date:   Expected time:   Means of arrival:   Comments:  Kristin Haile RN  02/05/22 5459

## 2022-02-05 NOTE — ED PROVIDER NOTES
Attending Note:    The patient was seen and examined by the mid-level provider. I also completed a face-to-face examination. HPI: Aleyda Calvillo is a 57 y.o. female who presents to the emergency department with a complaint of left-sided chest pain described as sharp aching pain that awakened her from sleep at 1 AM this morning. She had some mild shortness of breath but denies any diaphoresis. Symptoms are similar to what she experienced in the past with a previous heart attack. She currently rates her pain a 5/10 intensity. She states that it is eased up considerably. She denies any associated abdominal pain back pain flank pain nausea vomiting or diarrhea. She denies any fall trauma or injury. No cough or cold symptoms. No exposure to Covid. Medical history is significant for type 2 diabetes, hypertension, coronary artery disease, NSTEMI, hyperlipidemia, and smoking history. She did undergo general anesthesia for vaginal prolapse on December 29. She has been compliant with her Brilinta but states that she had to come off of the medication for a period of time surrounding the surgery. She has since resumed her medication. Physical Exam:     Constitutional: No apparent distress. Appears comfortable. Head: No visible evidence of trauma. Normocephalic. Eyes: Pupils equal and reactive. No photophobia. Conjunctiva normal.    HENT: Oral mucosa moist.  Airway patent. Neck:  Soft and supple. Nontender. Heart:  Regular rate and rhythm. No murmur. Lungs:  Clear to auscultation. No wheezes, rales, or ronchi. No conversational dyspnea or accessory muscle use. Abdomen:  Soft, non-distended. Nontender. No guarding rigidity or rebound. Musculoskeletal: Extremities non-tender with full range of motion. Radial and dorsalis pedis pulses were equal laterally. No calf tenderness erythema or edema. Neurological: Alert and oriented x 3. Speech clear.  No acute focal motor or sensory deficits. Skin: Skin is warm and dry. No rash. Psychiatric: Normal mood and affect. Behavior is normal.     DIAGNOSTIC RESULTS     EKG: All EKG's are interpreted by the Emergency Department Physician who either signs or Co-signs this chart in the absence of a cardiologist.    Normal sinus rhythm. Rate 68. WV interval 138 ms. QRS duration 68 ms. QTc 431 ms. R Axis XII degrees. Nonspecific T wave abnormalities. No ST elevation. EKG is unchanged from prior EKG from June 26, 2021. RADIOLOGY:   Non-plain film images such as CT, Ultrasound and MRI are read by the radiologist. Plain radiographic images are visualized and preliminarily interpreted by the emergency physician with the below findings:        Interpretation per the Radiologist below, if available at the time of this note:    CT CHEST PULMONARY EMBOLISM W CONTRAST   Preliminary Result   No evidence of pulmonary embolism or acute pulmonary abnormality. XR CHEST (2 VW)   Final Result   No acute process.          NM Cardiac Stress Test Nuclear Imaging    (Results Pending)         ED BEDSIDE ULTRASOUND:   Performed by ED Physician - none    LABS:  Labs Reviewed   CBC WITH AUTO DIFFERENTIAL - Abnormal; Notable for the following components:       Result Value    RBC 3.78 (*)     Hemoglobin 10.7 (*)     Hematocrit 32.9 (*)     RDW 15.8 (*)     All other components within normal limits    Narrative:     Performed at:  [facility]  [address] [facility] [address]   Phone [phone]   BASIC METABOLIC PANEL W/ REFLEX TO MG FOR LOW K - Abnormal; Notable for the following components:    Glucose 112 (*)     CREATININE 1.4 (*)     GFR Non- 38 (*)     GFR  46 (*)     All other components within normal limits    Narrative:     Performed at:  [facility]  [address] [employer] [address]   Phone [phone]   BRAIN NATRIURETIC PEPTIDE - Abnormal; Notable for the following components:    Pro- (*)     All other components within normal limits    Narrative:     Performed at:  [address] [facility]   Phone [phone]   BRAIN NATRIURETIC PEPTIDE - Abnormal; Notable for the following components:    Pro- (*)     All other components within normal limits    Narrative:     Performed at:  [address] [facility] [employer] [address]   Phone [phone]   BASIC METABOLIC PANEL W/ REFLEX TO MG FOR LOW K - Abnormal; Notable for the following components:    CREATININE 1.3 (*)     GFR Non- 41 (*)     GFR  50 (*)     All other components within normal limits    Narrative:     Performed at:  [address] [facility]   Phone [phone]   CBC - Abnormal; Notable for the following components:    RBC 3.53 (*)     Hemoglobin 10.1 (*)     Hematocrit 30.5 (*)     All other components within normal limits    Narrative:     Performed at:  [address] [facility]   Phone [phone]   POCT GLUCOSE - Abnormal; Notable for the following components:    POC Glucose 145 (*)     All other components within normal limits    Narrative:     Performed at:  [address] [facility]   Phone [phone]   POCT GLUCOSE - Abnormal; Notable for the following components:    POC Glucose 145 (*)     All other components within normal limits    Narrative:     Performed at:  [address] [employer]   Phone [phone]   POCT GLUCOSE - Abnormal; Notable for the following components:    POC Glucose 110 (*)     All other components within normal limits    Narrative: comfortable. EKG reveals normal sinus rhythm with nonspecific T wave abnormalities. No ST elevation. She did take aspirin 81 mg prior to arrival and was given additional aspirin here in the emergency department, 243 mg. She was also given nitroglycerin 0.4 mg sublingually. Because the patient did have surgery within the last 4 weeks, she was sent for CT chest to rule out pulmonary embolus. Oxygen saturation is 94% on room air. Creatinine 1.4. GFR 46. Hemoglobin 10.7. Troponin less than 0.01. Chest x-ray is negative.    4:09 PM: CTA chest is negative. No evidence of pulmonary embolus. The patient will be admitted for further treatment and evaluation. MDM      CRITICAL CARE TIME     I personally saw the patient and independently provided 0 minutes of non-concurrent critical care out of the total shared critical care time provided. This excludes separately reportable procedures. There was a high probability of clinically significant/life threatening deterioration in the patient's condition which required my urgent intervention. CONSULTS:  IP CONSULT TO HOSPITALIST  IP CONSULT TO CARDIOLOGY  IP CONSULT TO PHARMACY    PROCEDURES:  Unless otherwise noted below, none     Procedures        FINAL IMPRESSION      1. Chest pain, unspecified type    2. STAN (acute kidney injury) ([facility] [address]    3. Anemia, unspecified type          DISPOSITION/PLAN   DISPOSITION        PATIENT REFERRED TO:  No follow-up provider specified. DISCHARGE MEDICATIONS:  Current Discharge Medication List        Controlled Substances Monitoring:     No flowsheet data found. (Please note that portions of this note were completed with a voice recognition program.  Efforts were made to edit the dictations but occasionally words are mis-transcribed. )    [id number] Matthias Delgado,  (electronically signed)  Attending Emergency Physician      Mario Goodwin, DO  02/05/22 69 Keshia Holm, DO  02/05/22 4089 [facility] DO  02/06/22 172

## 2022-02-05 NOTE — ED NOTES
Pt states nitro did not help her chest pain but did give her a headache. Vitals stable. Resp easy and unlabored. SR on the monitor, no ectopy.      Stephanie Amaral RN  02/05/22 [id number]

## 2022-02-06 LAB
ANION GAP SERPL CALCULATED.3IONS-SCNC: 10 MMOL/L (ref 3–16)
BUN BLDV-MCNC: 15 MG/DL (ref 7–20)
CALCIUM SERPL-MCNC: 9 MG/DL (ref 8.3–10.6)
CHLORIDE BLD-SCNC: 104 MMOL/L (ref 99–110)
CO2: 26 MMOL/L (ref 21–32)
CREAT SERPL-MCNC: 1.3 MG/DL (ref 0.6–1.2)
EKG ATRIAL RATE: 68 BPM
EKG DIAGNOSIS: NORMAL
EKG P AXIS: 17 DEGREES
EKG P-R INTERVAL: 138 MS
EKG Q-T INTERVAL: 406 MS
EKG QRS DURATION: 68 MS
EKG QTC CALCULATION (BAZETT): 431 MS
EKG R AXIS: 12 DEGREES
EKG T AXIS: 14 DEGREES
EKG VENTRICULAR RATE: 68 BPM
ESTIMATED AVERAGE GLUCOSE: 128.4 MG/DL
GFR AFRICAN AMERICAN: 50
GFR NON-AFRICAN AMERICAN: 41
GLUCOSE BLD-MCNC: 110 MG/DL (ref 70–99)
GLUCOSE BLD-MCNC: 145 MG/DL (ref 70–99)
GLUCOSE BLD-MCNC: 162 MG/DL (ref 70–99)
GLUCOSE BLD-MCNC: 96 MG/DL (ref 70–99)
GLUCOSE BLD-MCNC: 98 MG/DL (ref 70–99)
HBA1C MFR BLD: 6.1 %
HCT VFR BLD CALC: 30.5 % (ref 36–48)
HEMOGLOBIN: 10.1 G/DL (ref 12–16)
MCH RBC QN AUTO: 28.7 PG (ref 26–34)
MCHC RBC AUTO-ENTMCNC: 33.3 G/DL (ref 31–36)
MCV RBC AUTO: 86.3 FL (ref 80–100)
PDW BLD-RTO: 15.4 % (ref 12.4–15.4)
PERFORMED ON: ABNORMAL
PERFORMED ON: NORMAL
PLATELET # BLD: 157 K/UL (ref 135–450)
PMV BLD AUTO: 8.1 FL (ref 5–10.5)
POTASSIUM REFLEX MAGNESIUM: 3.7 MMOL/L (ref 3.5–5.1)
RBC # BLD: 3.53 M/UL (ref 4–5.2)
SODIUM BLD-SCNC: 140 MMOL/L (ref 136–145)
TROPONIN: <0.01 NG/ML
WBC # BLD: 5 K/UL (ref 4–11)

## 2022-02-06 PROCEDURE — 9990000010 HC NO CHARGE VISIT

## 2022-02-06 PROCEDURE — 2580000003 HC RX 258: Performed by: INTERNAL MEDICINE

## 2022-02-06 PROCEDURE — 85027 COMPLETE CBC AUTOMATED: CPT

## 2022-02-06 PROCEDURE — 96372 THER/PROPH/DIAG INJ SC/IM: CPT

## 2022-02-06 PROCEDURE — 93010 ELECTROCARDIOGRAM REPORT: CPT | Performed by: INTERNAL MEDICINE

## 2022-02-06 PROCEDURE — 36415 COLL VENOUS BLD VENIPUNCTURE: CPT

## 2022-02-06 PROCEDURE — 80048 BASIC METABOLIC PNL TOTAL CA: CPT

## 2022-02-06 PROCEDURE — 94761 N-INVAS EAR/PLS OXIMETRY MLT: CPT

## 2022-02-06 PROCEDURE — 6370000000 HC RX 637 (ALT 250 FOR IP): Performed by: INTERNAL MEDICINE

## 2022-02-06 PROCEDURE — 99244 OFF/OP CNSLTJ NEW/EST MOD 40: CPT | Performed by: INTERNAL MEDICINE

## 2022-02-06 PROCEDURE — 6360000002 HC RX W HCPCS: Performed by: INTERNAL MEDICINE

## 2022-02-06 PROCEDURE — G0378 HOSPITAL OBSERVATION PER HR: HCPCS

## 2022-02-06 RX ORDER — ACETAMINOPHEN, ASPIRIN AND CAFFEINE 250; 250; 65 MG/1; MG/1; MG/1
1 TABLET, FILM COATED ORAL ONCE
Status: COMPLETED | OUTPATIENT
Start: 2022-02-06 | End: 2022-02-06

## 2022-02-06 RX ADMIN — ENOXAPARIN SODIUM 40 MG: 100 INJECTION SUBCUTANEOUS at 08:54

## 2022-02-06 RX ADMIN — SODIUM CHLORIDE, PRESERVATIVE FREE 10 ML: 5 INJECTION INTRAVENOUS at 10:12

## 2022-02-06 RX ADMIN — INSULIN LISPRO 2 UNITS: 100 INJECTION, SOLUTION INTRAVENOUS; SUBCUTANEOUS at 11:55

## 2022-02-06 RX ADMIN — ROSUVASTATIN CALCIUM 40 MG: 20 TABLET, FILM COATED ORAL at 08:54

## 2022-02-06 RX ADMIN — SODIUM CHLORIDE, PRESERVATIVE FREE 10 ML: 5 INJECTION INTRAVENOUS at 23:13

## 2022-02-06 RX ADMIN — CARVEDILOL 25 MG: 25 TABLET, FILM COATED ORAL at 08:53

## 2022-02-06 RX ADMIN — ACETAMINOPHEN, ASPIRIN AND CAFFEINE 1 TABLET: 250; 250; 65 TABLET, FILM COATED ORAL at 13:33

## 2022-02-06 RX ADMIN — FLUTICASONE PROPIONATE 1 SPRAY: 50 SPRAY, METERED NASAL at 11:11

## 2022-02-06 RX ADMIN — ASPIRIN 81 MG 81 MG: 81 TABLET ORAL at 08:53

## 2022-02-06 RX ADMIN — ACETAMINOPHEN 650 MG: 325 TABLET ORAL at 16:59

## 2022-02-06 RX ADMIN — ACETAMINOPHEN 650 MG: 325 TABLET ORAL at 08:53

## 2022-02-06 RX ADMIN — MULTIPLE VITAMINS W/ MINERALS TAB 1 TABLET: TAB at 08:53

## 2022-02-06 RX ADMIN — INSULIN LISPRO 1 UNITS: 100 INJECTION, SOLUTION INTRAVENOUS; SUBCUTANEOUS at 21:15

## 2022-02-06 RX ADMIN — AMLODIPINE BESYLATE 5 MG: 5 TABLET ORAL at 08:53

## 2022-02-06 RX ADMIN — TICAGRELOR 90 MG: 90 TABLET ORAL at 08:53

## 2022-02-06 RX ADMIN — GABAPENTIN 300 MG: 300 CAPSULE ORAL at 21:15

## 2022-02-06 RX ADMIN — TICAGRELOR 90 MG: 90 TABLET ORAL at 21:15

## 2022-02-06 ASSESSMENT — PAIN DESCRIPTION - FREQUENCY
FREQUENCY: INTERMITTENT

## 2022-02-06 ASSESSMENT — PAIN DESCRIPTION - PAIN TYPE
TYPE: ACUTE PAIN

## 2022-02-06 ASSESSMENT — PAIN DESCRIPTION - ORIENTATION
ORIENTATION: MID

## 2022-02-06 ASSESSMENT — PAIN DESCRIPTION - LOCATION
LOCATION: HEAD

## 2022-02-06 ASSESSMENT — PAIN - FUNCTIONAL ASSESSMENT
PAIN_FUNCTIONAL_ASSESSMENT: ACTIVITIES ARE NOT PREVENTED

## 2022-02-06 ASSESSMENT — PAIN DESCRIPTION - DESCRIPTORS
DESCRIPTORS: HEADACHE

## 2022-02-06 ASSESSMENT — PAIN DESCRIPTION - ONSET
ONSET: ON-GOING

## 2022-02-06 ASSESSMENT — PAIN DESCRIPTION - PROGRESSION
CLINICAL_PROGRESSION: GRADUALLY IMPROVING

## 2022-02-06 ASSESSMENT — PAIN SCALES - GENERAL
PAINLEVEL_OUTOF10: 10
PAINLEVEL_OUTOF10: 0
PAINLEVEL_OUTOF10: 1
PAINLEVEL_OUTOF10: 1
PAINLEVEL_OUTOF10: 3
PAINLEVEL_OUTOF10: 3

## 2022-02-06 NOTE — PROGRESS NOTES
Physical Therapy  PT referral received and chart reviewed. Pt up ad arlette. Will sign off at this time. Spoke with nursing.   Ander Montes, PT

## 2022-02-06 NOTE — PROGRESS NOTES
Hospitalist Progress Note      PCP: Selena Duarte MD    Date of Admission: 2/5/2022    Chief Complaint: Chest pain    Hospital Course: 47-year-old female with history of coronary artery disease admitted with chest pain. Subjective: Still having intermittent chest pain which is occurring at rest.  Not reproducible with palpation  She is troubled by this       Medications:  Reviewed    Infusion Medications    sodium chloride      dextrose       Scheduled Medications    sodium chloride flush  10 mL IntraVENous 2 times per day    enoxaparin  40 mg SubCUTAneous Daily    insulin lispro  0-12 Units SubCUTAneous TID WC    insulin lispro  0-6 Units SubCUTAneous Nightly    aspirin  81 mg Oral Daily    ticagrelor  90 mg Oral BID    fluticasone  1 spray Each Nostril Daily    gabapentin  300 mg Oral Nightly    therapeutic multivitamin-minerals  1 tablet Oral Daily    rosuvastatin  40 mg Oral Daily    amLODIPine  5 mg Oral Daily     PRN Meds: nitroGLYCERIN, sodium chloride flush, sodium chloride, potassium chloride **OR** potassium alternative oral replacement **OR** potassium chloride, potassium chloride, magnesium sulfate, promethazine **OR** ondansetron, magnesium hydroxide, acetaminophen **OR** acetaminophen, glucose, dextrose, glucagon (rDNA), dextrose      Intake/Output Summary (Last 24 hours) at 2/6/2022 1419  Last data filed at 2/6/2022 1985  Gross per 24 hour   Intake 120 ml   Output --   Net 120 ml       Physical Exam Performed:    /65   Pulse 73   Temp 98.3 °F (36.8 °C) (Oral)   Resp 16   Ht 5' 5\" (1.651 m)   Wt 169 lb 12.1 oz (77 kg)   LMP 02/09/2015 Comment: spotting, haven't had periods approx 1 year  SpO2 98%   BMI 28.25 kg/m²     General appearance: No apparent distress, appears stated age and cooperative. HEENT: Pupils equal, round, and reactive to light. Conjunctivae/corneas clear. Neck: Supple, with full range of motion. No jugular venous distention.  Trachea midline. Respiratory:  Normal respiratory effort. Clear to auscultation, bilaterally without Rales/Wheezes/Rhonchi. Cardiovascular: Regular rate and rhythm with normal S1/S2 without murmurs, rubs or gallops. Abdomen: Soft, non-tender, non-distended with normal bowel sounds. Musculoskeletal: No clubbing, cyanosis or edema bilaterally. Full range of motion without deformity. Skin: Skin color, texture, turgor normal.  No rashes or lesions. Neurologic:  Neurovascularly intact without any focal sensory/motor deficits. Cranial nerves: II-XII intact, grossly non-focal.  Psychiatric: Alert and oriented, thought content appropriate, normal insight  Capillary Refill: Brisk,3 seconds, normal   Peripheral Pulses: +2 palpable, equal bilaterally       Labs:   Recent Labs     02/05/22  1402 02/06/22  0610   WBC 6.0 5.0   HGB 10.7* 10.1*   HCT 32.9* 30.5*    157     Recent Labs     02/05/22  1402 02/06/22  0610    140   K 3.5 3.7    104   CO2 26 26   BUN 18 15   CREATININE 1.4* 1.3*   CALCIUM 9.3 9.0     No results for input(s): AST, ALT, BILIDIR, BILITOT, ALKPHOS in the last 72 hours. No results for input(s): INR in the last 72 hours. Recent Labs     02/05/22  1402 02/05/22  1832 02/05/22  2352   CKTOTAL  --  61  --    TROPONINI <0.01 <0.01 <0.01       Urinalysis:      Lab Results   Component Value Date    NITRU Negative 06/24/2020    BLOODU Negative 06/24/2020    SPECGRAV 1.010 06/24/2020    GLUCOSEU Negative 06/24/2020       Radiology:  CT CHEST PULMONARY EMBOLISM W CONTRAST   Preliminary Result   No evidence of pulmonary embolism or acute pulmonary abnormality. XR CHEST (2 VW)   Final Result   No acute process.          NM Cardiac Stress Test Nuclear Imaging    (Results Pending)           Assessment/Plan:    Chest pain-unclear etiology I am not convinced this is cardiac in etiology  EKG shows no ischemia  She does have a history of coronary artery disease so I think stress test is warranted  Case was discussed with Dr. Paige Stark ordered for tomorrow  Beta-blocker to be held  Continue aspirin and Brilinta    Acute Renal insufficiency-mild elevation from baseline creatinine I have held her thiazide  Will reconsider whether we want to start this if it is compromising creatinine  Cr 6/20 was 0.7    DVT Prophylaxis: lovenox  Diet: ADULT DIET;  Regular; 4 carb choices (60 gm/meal)  Diet NPO  Code Status: Full Code      Dispo -  Home tomorrow if stress test negative    Mindy Wallace MD

## 2022-02-06 NOTE — H&P
Hospital Medicine History & Physical      PCP: Erika Smith MD    Date of Admission: 2/5/2022    Chief Complaint:  CP    History Of Present Illness:  Patient is a 29-year-old female with past medical history of CAD s/p stenting diabetes mellitus,, hypertension hyperlipidemia who presents to the hospital for chest pain, patient mentions chest pain is left-sided, localized to under the breast but she has a squeezing sensation, 7/10 intensity, denies moving furniture's injuries however it has been persistent since morning. Denied shortness of breath nausea vomiting diarrhea constipation dysuria. Past Medical History:          Diagnosis Date    Allergic rhinitis     Hyperlipidemia     Hypertension     Neuropathy     Type II or unspecified type diabetes mellitus without mention of complication, not stated as uncontrolled        Past Surgical History:          Procedure Laterality Date    ANTERIOR CRUCIATE LIGAMENT REPAIR      PTCA      SANGEETA to RCA    TUBAL LIGATION         Medications Prior to Admission:      Prior to Admission medications    Medication Sig Start Date End Date Taking? Authorizing Provider   rosuvastatin (CRESTOR) 40 MG tablet TAKE ONE TABLET BY MOUTH DAILY 1/3/22   Rossana Curry MD   carvedilol (COREG) 25 MG tablet TAKE ONE TABLET BY MOUTH TWICE A DAY 12/1/21   Gely Hartman MD   BRILINTA 90 MG TABS tablet TAKE ONE TABLET BY MOUTH TWICE A DAY 3/30/21   Gely Hartman MD   hydroCHLOROthiazide (MICROZIDE) 12.5 MG capsule Take 1 capsule by mouth every morning 7/7/20   NATY Whittaker - CNP   fluticasone (FLONASE) 50 MCG/ACT nasal spray 1 spray by Each Nostril route daily 6/25/20   Emilia Nicole DO   gabapentin (NEURONTIN) 300 MG capsule Take 300 mg by mouth as needed.  300 mg in AM and 600 mg at Bedtime     Historical Provider, MD   losartan (COZAAR) 100 MG tablet Take 100 mg by mouth daily     Historical Provider, MD   Multiple Vitamins-Minerals (THERAPEUTIC MULTIVITAMIN-MINERALS) tablet Take 1 tablet by mouth daily    Historical Provider, MD   aspirin 81 MG chewable tablet Take 81 mg by mouth daily    Historical Provider, MD   metFORMIN (GLUCOPHAGE) 500 MG tablet Take 1 tablet by mouth 2 times daily (with meals). 2/13/15   Radha Mike MD       Allergies:  Percocet [oxycodone-acetaminophen]    Social History:      TOBACCO:   reports that she quit smoking about 19 months ago. Her smoking use included cigarettes. She has a 1.25 pack-year smoking history. She has never used smokeless tobacco.  ETOH:   reports current alcohol use. Family History:       Reviewed in detail and non contributory          Problem Relation Age of Onset    Arthritis Mother     High Blood Pressure Maternal Grandmother        REVIEW OF SYSTEMS:   Pertinent positives as noted in the HPI. All other systems reviewed and negative. PHYSICAL EXAM PERFORMED:    /73   Pulse 60   Temp 98.7 °F (37.1 °C) (Oral)   Resp 15   Ht 5' 5\" (1.651 m)   Wt 169 lb 1.6 oz (76.7 kg)   LMP 02/09/2015 Comment: spotting, haven't had periods approx 1 year  SpO2 97%   BMI 28.14 kg/m²     General appearance:  No apparent distress, cooperative. HEENT:  Normal cephalic, atraumatic without obvious deformity. Conjunctivae/corneas clear. Neck: Supple, with full range of motion. No cervical lymphadenopathy  Respiratory:  Normal respiratory effort. Clear to auscultation, bilaterally without Rales/Wheezes/Rhonchi. Cardiovascular: Tenderness to palpation to chest noted. Regular rate and rhythm with normal S1/S2 without murmurs, rubs or gallops. Abdomen: Soft, non-tender, non-distended, normal bowel sounds. Musculoskeletal:  No edema noted bilaterally. No tenderness on palpation   Skin: no rash visible  Neurologic:  Neurologically intact without any focal sensory/motor deficits.   grossly non-focal.  Psychiatric:  Alert and oriented, normal mood  Peripheral Pulses: +2 palpable, equal bilaterally Labs:     Recent Labs     02/05/22  1402   WBC 6.0   HGB 10.7*   HCT 32.9*        Recent Labs     02/05/22  1402      K 3.5      CO2 26   BUN 18   CREATININE 1.4*   CALCIUM 9.3     No results for input(s): AST, ALT, BILIDIR, BILITOT, ALKPHOS in the last 72 hours. No results for input(s): INR in the last 72 hours. Recent Labs     02/05/22  1402   TROPONINI <0.01       Urinalysis:      Lab Results   Component Value Date    NITRU Negative 06/24/2020    BLOODU Negative 06/24/2020    SPECGRAV 1.010 06/24/2020    GLUCOSEU Negative 06/24/2020       Radiology:       CT CHEST PULMONARY EMBOLISM W CONTRAST   Preliminary Result   No evidence of pulmonary embolism or acute pulmonary abnormality. XR CHEST (2 VW)   Final Result   No acute process. Active Hospital Problems    Diagnosis Date Noted    Chest pain [R07.9] 02/05/2022       Patient is a 40-year-old female with past medical history of CAD s/p stenting diabetes mellitus,, hypertension hyperlipidemia who presents to the hospital for chest pain, patient mentions chest pain is left-sided, localized to under the breast but she has a squeezing sensation, 7/10 intensity, denies moving furniture's injuries however it has been persistent since morning. Denied shortness of breath nausea vomiting diarrhea constipation dysuria. Assessment  Chest pain, rule out ACS, suspect musculoskeletal  Acute kidney injury  Diabetes mellitus  Hypertension  Hyperlipidemia    Plan  Cardiology was consulted from emergency department, requested patient to be admitted to monitor troponin, monitor on cardiac telemetry, monitor BMP, check CK level  Insulin sliding scale  Resume home medication  DVT prophylaxis-Lovenox  Diet: ADULT DIET;  Regular; 4 carb choices (60 gm/meal)  Code Status: Full Code    PT/OT Eval Status: ordered    Dispo - pending clinical improvement       Sathish Ballard MD    The note was completed using EMR and Dragon dictation system. Every effort was made to ensure accuracy; however, inadvertent computerized transcription errors may be present. Thank you Micaela Kwon MD for the opportunity to be involved in this patient's care. If you have any questions or concerns please feel free to contact me at [phone].     Lionel Burrell MD

## 2022-02-06 NOTE — CONSULTS
[facility], [place], operated by [facility]    Dimple Larson  1960 February 6, 2022    Reason for Consult: Chest Pain    CC: Chest Pain    HPI:  The patient is 57 y.o. female with a past medical history significant for CAD with prior PCI, essential hypertension, hyperlipidemia and type 2 DM who presented to [facility] with chest pain. I was asked to see her for this as she follows with me in the office. She had a NSTEMI in 2020 at which time I intervened upon a 95% prox to mid RCA lesion with overlapping 3.5mm SANGEETA dilated to 3.91mm. I last saw her in the office on 12/8/2021 at which time she was doing well. Joseph Aragon states that this chest pain started while she was at rest in bed reading a book two nights ago. She feels it under her left breast. The pain was constant. She put a heating pad on it and took an aspirin with no relief. She woke up with the pain and it had not stopped. She describes the pain now as \"off and on\" It may last hours and then stop before starting again. There are no aggravating or relieving factors for it. She denies any shortness of breath but \"I was breathing weird\". She felt some burning in the center of her chest.     Review of Systems:  Constitutional: No fatigue, weakness, night sweats or fever. HEENT: No new vision difficulties or ringing in the ears. Respiratory: No new SOB, PND, orthopnea or cough. Cardiovascular: See HPI   GI: No n/v, diarrhea, constipation, abdominal pain or changes in bowel habits. No melena, no hematochezia  : No urinary frequency, urgency, incontinence, hematuria or dysuria. Skin: No cyanosis or skin lesions. Musculoskeletal: No new muscle or joint pain. Neurological: No syncope or TIA-like symptoms.   Psychiatric: No anxiety, insomnia or depression     Past Medical History:   Diagnosis Date    Allergic rhinitis     Hyperlipidemia     Hypertension     Neuropathy     Type II or unspecified type diabetes mellitus without mention of complication, not stated as uncontrolled      Past Surgical History:   Procedure Laterality Date    ANTERIOR CRUCIATE LIGAMENT REPAIR      PTCA      SANGEETA to RCA    TUBAL LIGATION       Family History   Problem Relation Age of Onset    Arthritis Mother     High Blood Pressure Maternal Grandmother      Social History     Tobacco Use    Smoking status: Former Smoker     Packs/day: 0.25     Years: 5.00     Pack years: 1.25     Types: Cigarettes     Quit date: 2020     Years since quittin.6    Smokeless tobacco: Never Used   Vaping Use    Vaping Use: Never used   Substance Use Topics    Alcohol use: Yes     Comment: a little wine on holidays    Drug use: No       Allergies   Allergen Reactions    Percocet [Oxycodone-Acetaminophen] Nausea Only     Current Facility-Administered Medications   Medication Dose Route Frequency Provider Last Rate Last Admin    nitroGLYCERIN (NITROSTAT) SL tablet 0.4 mg  0.4 mg SubLINGual Q5 Min PRN NATY Peraza - CNP   0.4 mg at 22 1426    sodium chloride flush 0.9 % injection 10 mL  10 mL IntraVENous 2 times per day Echo Wasserman MD   10 mL at 227    sodium chloride flush 0.9 % injection 10 mL  10 mL IntraVENous PRN Echo Wasserman MD        0.9 % sodium chloride infusion  25 mL IntraVENous PRN Echo Wasserman MD        potassium chloride (KLOR-CON M) extended release tablet 40 mEq  40 mEq Oral PRN Echo Wasserman MD        Or    potassium bicarb-citric acid (EFFER-K) effervescent tablet 40 mEq  40 mEq Oral PRN Echo Wasserman MD        Or    potassium chloride 10 mEq/100 mL IVPB (Peripheral Line)  10 mEq IntraVENous PRN Echo Wasserman MD        potassium chloride 10 mEq/100 mL IVPB (Peripheral Line)  10 mEq IntraVENous PRN Echo Wasserman MD        magnesium sulfate 2000 mg in 50 mL IVPB premix  2,000 mg IntraVENous PRN Echo Wasserman MD        enoxaparin (LOVENOX) injection 40 mg  40 mg SubCUTAneous Daily Echo Wasserman MD   40 mg at 22 7532    promethazine (PHENERGAN) tablet 12.5 mg  12.5 mg Oral Q6H PRN Álvaro Zhu MD        Or    ondansetron (ZOFRAN) injection 4 mg  4 mg IntraVENous Q6H PRN Álvaro Zhu MD   4 mg at 02/05/22 2143    magnesium hydroxide (MILK OF MAGNESIA) 400 MG/5ML suspension 30 mL  30 mL Oral Daily PRN Álvaro Zhu MD        acetaminophen (TYLENOL) tablet 650 mg  650 mg Oral Q6H PRN Álvaro Zhu MD   650 mg at 02/06/22 1008    Or    acetaminophen (TYLENOL) suppository 650 mg  650 mg Rectal Q6H PRN Álvaro Zhu MD        glucose (GLUTOSE) 40 % oral gel 15 g  15 g Oral PRN Álvaro Zhu MD        dextrose 50 % IV solution  12.5 g IntraVENous PRN Álvaro Zhu MD        glucagon (rDNA) injection 1 mg  1 mg IntraMUSCular PRN Álvaro Zhu MD        dextrose 5 % solution  100 mL/hr IntraVENous PRN Álvaro Zhu MD        insulin lispro (HUMALOG) injection vial 0-12 Units  0-12 Units SubCUTAneous TID  Álvaro Zhu MD        insulin lispro (HUMALOG) injection vial 0-6 Units  0-6 Units SubCUTAneous Nightly Álvaro Zhu MD   2 Units at 02/05/22 2206    aspirin chewable tablet 81 mg  81 mg Oral Daily Álvaro Zhu MD   81 mg at 02/06/22 0853    ticagrelor (BRILINTA) tablet 90 mg  90 mg Oral BID Álvaro Zhu MD   90 mg at 02/06/22 0853    carvedilol (COREG) tablet 25 mg  25 mg Oral BID  Christopher Patel MD   25 mg at 02/06/22 0853    fluticasone (FLONASE) 50 MCG/ACT nasal spray 1 spray  1 spray Each Nostril Daily Christopher Patel MD        gabapentin (NEURONTIN) capsule 300 mg  300 mg Oral Nightly Álvaro Zhu MD   300 mg at 02/05/22 2126    therapeutic multivitamin-minerals 1 tablet  1 tablet Oral Daily Álvaro Zhu MD   1 tablet at 02/06/22 0853    rosuvastatin (CRESTOR) tablet 40 mg  40 mg Oral Daily Álvaro Zhu MD   40 mg at 02/06/22 0854    amLODIPine (NORVASC) tablet 5 mg  5 mg Oral Daily Álvaro Zhu MD   5 mg at 02/06/22 0853       Physical Exam:   /65   Pulse 63   Temp 98.3 °F (36.8 °C) (Oral)   Resp 16   Ht 5' 5\" (1.651 m)   Wt 169 lb 12.1 oz (77 kg)   LMP 02/09/2015 Comment: spotting, haven't had periods approx 1 year  SpO2 98%   BMI 28.25 kg/m²   No intake or output data in the 24 hours ending 02/06/22 0901  Wt Readings from Last 2 Encounters:   02/06/22 169 lb 12.1 oz (77 kg)   12/08/21 171 lb 12.8 oz (77.9 kg)     Constitutional: She is oriented to person, place, and time. She appears well-developed and well-nourished. In no acute distress. Head: Normocephalic and atraumatic. Neck: Neck supple. No JVD present. Carotid bruit is not present. No mass and no thyromegaly present. No lymphadenopathy present. Cardiovascular: Normal rate, regular rhythm, normal heart sounds and intact distal pulses. Exam reveals no gallop and no friction rub. No murmur heard. Pulmonary/Chest: Effort normal and breath sounds normal. No respiratory distress. She has no wheezes, rhonchi or rales. Abdominal: Soft, non-tender. Bowel sounds and aorta are normal. She exhibits no organomegaly, mass or bruit. Extremities: No edema, cyanosis, or clubbing. Pulses are 2+ radial/carotid/dorsalis pedis and posterior tibial bilaterally. Neurological: She is alert and oriented to person, place, and time. She has normal reflexes. No cranial nerve deficit. Coordination normal.   Skin: Skin is warm and dry. There is no rash or diaphoresis. Psychiatric: She has a normal mood and affect. Her speech is normal and behavior is normal.     Personally reviewed and interpreted   EKG Interpretation: Sinus rhythm with no acute ST or T wave changes, normal intervals and axis.      Lab Review:   Lab Results   Component Value Date    TRIG 208 06/25/2020    HDL 43 06/21/2021    LDLCALC 161 06/21/2021    LDLDIRECT 166 06/21/2021    LABVLDL 44 06/21/2021     Lab Results   Component Value Date     02/06/2022    K 3.7 02/06/2022    BUN 15 02/06/2022    CREATININE 1.3 02/06/2022     Recent Labs     02/05/22  1402 02/05/22  1402 02/06/22  0610   WBC 6.0   < > 5.0   HGB 10.7*  --  10.1*   HCT 32.9*  --  30.5*     --  157    < > = values in this interval not displayed. Echo 9/17/2020:  Normal left ventricle size, wall thickness, and systolic function with an   estimated ejection fraction of 55-60%. No regional wall motion abnormalities   are seen. The right ventricle is normal in size and function. No significant valve abnormalities noted. Left Heart Catheterization 6/24/2020:  1. Right-dominant coronary arterial system with a 95% proximal to mid  RCA lesion with DAVID 2 flow. This was intervened upon with overlapping  3.5-mm stents. More distal one was 38 mm, the more proximal was 15 mm. These were post dilated to 3.91 mm in diameter. There was 0% residual  stenosis and DAVID 3 flow in the vessel. In the left system, there is no  significant left main, circumflex, or left anterior descending artery  disease. 2.  Low-normal left ventricular systolic function with LV ejection  fraction of 50%. Mild inferior basal to mid hypokinesis. 3.  Normal left ventricular end-diastolic pressure at 5 mmHg. 4.  No gradient across the aortic valve on pullback to suggest aortic  stenosis. Chest CT 2/5/22:  No evidence of pulmonary embolism or acute pulmonary abnormality. Assessment / Plan:    1. Chest Pain, atypical  I don't think that Radha's chest pain is anginal but do not have a good explanation for it. I would have her complete a treadmill stress perfusion tomorrow to assess for ischemia and to assess LV function. Continue her current medications otherwise with aspirin and Brilinta. Hold carvedilol for the stress test.    2. Hyperlipidemia with goal LDL<70mg/dL  Continue high dose rosuvastatin. She never had her lipids rechecked as we ordered so I will order those again to be done here.

## 2022-02-06 NOTE — PLAN OF CARE
Problem: Pain:  Goal: Pain level will decrease  Description: Pain level will decrease  2/6/2022 0731 by Tamiko Garcia RN  Outcome: Ongoing   Pain level will decrease  Problem: Pain:  Goal: Control of acute pain  Description: Control of acute pain  2/6/2022 0731 by Tamiko Garcia RN  Outcome: Ongoing   Patient educated on acute pain. Taught patient to use call light to ask for pain medication. PRN pain medication given for acute pain. Will continue to monitor pain per unit protocol. Problem: Pain:  Goal: Control of chronic pain  Description: Control of chronic pain  2/6/2022 0731 by Tamiko Garcia RN  Outcome: Ongoing     Problem: Falls - Risk of:  Goal: Will remain free from falls  Description: Will remain free from falls  Outcome: Ongoing   Will remain free from falls. Fall precautions are in place. Call light, telephone and bedside table are within reach.    Problem: Falls - Risk of:  Goal: Absence of physical injury  Description: Absence of physical injury  Outcome: Ongoing

## 2022-02-06 NOTE — PROGRESS NOTES
Patient is A&Ox4. Patient is resting in bed, awake and quiet. Family is at bedside. Room air. Side rails are up x2. Fall precautions are in place. Patient is UAL. Bed is in lowest position. Call light, telephone and bedside table are within reach. Patient denies chest pain at present. VSS taken. AM meds. Shift assessment completed. Will continue to monitor patient per unit protocols.  Electronically signed by Long Kelley RN on 2/6/2022 at 10:54 AM

## 2022-02-06 NOTE — PROGRESS NOTES
Patient is resting in bed, awake and quiet. Family is at bedside. Room air. Side rails are up x2. Fall precautions are in place. Patient is UAL. Able to make needs known. Bed is in lowest position. Call light, telephone and bedside table are within reach. Will continue to monitor patient per unit protocols.  Electronically signed by Efrain Puri RN on 2/6/2022 at 4:27 PM

## 2022-02-07 VITALS
HEIGHT: 65 IN | BODY MASS INDEX: 28.65 KG/M2 | HEART RATE: 62 BPM | OXYGEN SATURATION: 98 % | WEIGHT: 171.96 LBS | TEMPERATURE: 98.3 F | DIASTOLIC BLOOD PRESSURE: 87 MMHG | SYSTOLIC BLOOD PRESSURE: 136 MMHG | RESPIRATION RATE: 18 BRPM

## 2022-02-07 LAB
ANION GAP SERPL CALCULATED.3IONS-SCNC: 10 MMOL/L (ref 3–16)
BUN BLDV-MCNC: 15 MG/DL (ref 7–20)
CALCIUM SERPL-MCNC: 9.3 MG/DL (ref 8.3–10.6)
CHLORIDE BLD-SCNC: 107 MMOL/L (ref 99–110)
CHOLESTEROL, TOTAL: 107 MG/DL (ref 0–199)
CO2: 26 MMOL/L (ref 21–32)
CREAT SERPL-MCNC: 1.2 MG/DL (ref 0.6–1.2)
GFR AFRICAN AMERICAN: 55
GFR NON-AFRICAN AMERICAN: 45
GLUCOSE BLD-MCNC: 125 MG/DL (ref 70–99)
GLUCOSE BLD-MCNC: 130 MG/DL (ref 70–99)
GLUCOSE BLD-MCNC: 88 MG/DL (ref 70–99)
HCT VFR BLD CALC: 32 % (ref 36–48)
HDLC SERPL-MCNC: 40 MG/DL (ref 40–60)
HEMOGLOBIN: 10.6 G/DL (ref 12–16)
LDL CHOLESTEROL CALCULATED: 46 MG/DL
LV EF: 70 %
LVEF MODALITY: NORMAL
MCH RBC QN AUTO: 28.7 PG (ref 26–34)
MCHC RBC AUTO-ENTMCNC: 33.2 G/DL (ref 31–36)
MCV RBC AUTO: 86.6 FL (ref 80–100)
PDW BLD-RTO: 15.5 % (ref 12.4–15.4)
PERFORMED ON: ABNORMAL
PERFORMED ON: ABNORMAL
PLATELET # BLD: 172 K/UL (ref 135–450)
PMV BLD AUTO: 8.3 FL (ref 5–10.5)
POTASSIUM REFLEX MAGNESIUM: 3.6 MMOL/L (ref 3.5–5.1)
RBC # BLD: 3.7 M/UL (ref 4–5.2)
SODIUM BLD-SCNC: 143 MMOL/L (ref 136–145)
TRIGL SERPL-MCNC: 104 MG/DL (ref 0–150)
VLDLC SERPL CALC-MCNC: 21 MG/DL
WBC # BLD: 6.1 K/UL (ref 4–11)

## 2022-02-07 PROCEDURE — A9502 TC99M TETROFOSMIN: HCPCS | Performed by: INTERNAL MEDICINE

## 2022-02-07 PROCEDURE — 6360000002 HC RX W HCPCS: Performed by: INTERNAL MEDICINE

## 2022-02-07 PROCEDURE — 96372 THER/PROPH/DIAG INJ SC/IM: CPT

## 2022-02-07 PROCEDURE — 80061 LIPID PANEL: CPT

## 2022-02-07 PROCEDURE — G0378 HOSPITAL OBSERVATION PER HR: HCPCS

## 2022-02-07 PROCEDURE — 80048 BASIC METABOLIC PNL TOTAL CA: CPT

## 2022-02-07 PROCEDURE — 36415 COLL VENOUS BLD VENIPUNCTURE: CPT

## 2022-02-07 PROCEDURE — 94761 N-INVAS EAR/PLS OXIMETRY MLT: CPT

## 2022-02-07 PROCEDURE — 2580000003 HC RX 258: Performed by: INTERNAL MEDICINE

## 2022-02-07 PROCEDURE — 78452 HT MUSCLE IMAGE SPECT MULT: CPT

## 2022-02-07 PROCEDURE — 9990000010 HC NO CHARGE VISIT

## 2022-02-07 PROCEDURE — 93017 CV STRESS TEST TRACING ONLY: CPT

## 2022-02-07 PROCEDURE — 6370000000 HC RX 637 (ALT 250 FOR IP): Performed by: INTERNAL MEDICINE

## 2022-02-07 PROCEDURE — 3430000000 HC RX DIAGNOSTIC RADIOPHARMACEUTICAL: Performed by: INTERNAL MEDICINE

## 2022-02-07 PROCEDURE — 85027 COMPLETE CBC AUTOMATED: CPT

## 2022-02-07 RX ORDER — PANTOPRAZOLE SODIUM 40 MG/1
40 TABLET, DELAYED RELEASE ORAL
Qty: 90 TABLET | Refills: 1 | Status: SHIPPED | OUTPATIENT
Start: 2022-02-07

## 2022-02-07 RX ORDER — NITROGLYCERIN 0.4 MG/1
TABLET SUBLINGUAL
Qty: 25 TABLET | Refills: 3 | Status: SHIPPED | OUTPATIENT
Start: 2022-02-07

## 2022-02-07 RX ADMIN — TICAGRELOR 90 MG: 90 TABLET ORAL at 13:12

## 2022-02-07 RX ADMIN — SODIUM CHLORIDE, PRESERVATIVE FREE 10 ML: 5 INJECTION INTRAVENOUS at 13:13

## 2022-02-07 RX ADMIN — AMLODIPINE BESYLATE 5 MG: 5 TABLET ORAL at 13:12

## 2022-02-07 RX ADMIN — TETROFOSMIN 30 MILLICURIE: 1.38 INJECTION, POWDER, LYOPHILIZED, FOR SOLUTION INTRAVENOUS at 11:26

## 2022-02-07 RX ADMIN — ROSUVASTATIN CALCIUM 40 MG: 20 TABLET, FILM COATED ORAL at 13:12

## 2022-02-07 RX ADMIN — ENOXAPARIN SODIUM 40 MG: 100 INJECTION SUBCUTANEOUS at 13:12

## 2022-02-07 RX ADMIN — ASPIRIN 81 MG 81 MG: 81 TABLET ORAL at 13:11

## 2022-02-07 RX ADMIN — MULTIPLE VITAMINS W/ MINERALS TAB 1 TABLET: TAB at 13:11

## 2022-02-07 RX ADMIN — TETROFOSMIN 10 MILLICURIE: 1.38 INJECTION, POWDER, LYOPHILIZED, FOR SOLUTION INTRAVENOUS at 08:30

## 2022-02-07 RX ADMIN — FLUTICASONE PROPIONATE 1 SPRAY: 50 SPRAY, METERED NASAL at 13:11

## 2022-02-07 ASSESSMENT — PAIN SCALES - GENERAL: PAINLEVEL_OUTOF10: 0

## 2022-02-07 NOTE — DISCHARGE SUMMARY
Hospital Medicine Discharge Summary    Patient ID: [facility]      Patient's PCP: Harjinder Hillman MD    Admit Date: 2/5/2022     Discharge Date:   2/7/2022  Admitting Provider: Sathish Ballard MD     Discharge Provider: Javon Kent MD     Discharge Diagnoses:  Atypical chest pain  Coronary artery disease  Hypertension  GERD    The patient was seen and examined on day of discharge and this discharge summary is in conjunction with any daily progress note from day of discharge. Hospital Course: Patient is a 69-year-old female who presented to the hospital with chest pain. Patient ruled out for an MI with serial cardiac enzymes. EKG showed no evidence of ischemia  Patient was seen by her cardiologist Dr. Julieth Serrano was ordered  GXT Myoview was negative  It was of our opinion that this chest pain was not cardiac in etiology. I started her on a proton pump inhibitor  Advised her to follow-up with her primary care physician to look into other potential causes of noncardiac chest pain such as GI related GERD, gallbladder etc.  She is already had mammogram last year needs to continue with her yearly mammograms            Physical Exam Performed:     /87   Pulse 62   Temp 98.3 °F (36.8 °C) (Oral)   Resp 18   Ht 5' 5\" (1.651 m)   Wt 171 lb 15.3 oz (78 kg)   LMP 02/09/2015 Comment: spotting, haven't had periods approx 1 year  SpO2 98%   BMI 28.62 kg/m²       General appearance:  No apparent distress, appears stated age and cooperative. HEENT:  Normal cephalic, atraumatic without obvious deformity. Pupils equal, round, and reactive to light. Extra ocular muscles intact. Conjunctivae/corneas clear. Neck: Supple, with full range of motion. No jugular venous distention. Trachea midline. Respiratory:  Normal respiratory effort. Clear to auscultation, bilaterally without Rales/Wheezes/Rhonchi. Cardiovascular:  Regular rate and rhythm with normal S1/S2 without murmurs, rubs or gallops.   Abdomen: Soft, non-tender, non-distended with normal bowel sounds. Musculoskeletal:  No clubbing, cyanosis or edema bilaterally. Full range of motion without deformity. Skin: Skin color, texture, turgor normal.  No rashes or lesions. Neurologic:  Neurovascularly intact without any focal sensory/motor deficits. Cranial nerves: II-XII intact, grossly non-focal.  Psychiatric:  Alert and oriented, thought content appropriate, normal insight  Capillary Refill: Brisk,< 3 seconds   Peripheral Pulses: +2 palpable, equal bilaterally       Labs: For convenience and continuity at follow-up the following most recent labs are provided:      CBC:    Lab Results   Component Value Date    WBC 6.1 02/07/2022    HGB 10.6 02/07/2022    HCT 32.0 02/07/2022     02/07/2022       Renal:    Lab Results   Component Value Date     02/07/2022    K 3.6 02/07/2022     02/07/2022    CO2 26 02/07/2022    BUN 15 02/07/2022    CREATININE 1.2 02/07/2022    CALCIUM 9.3 02/07/2022         Significant Diagnostic Studies    Radiology:   NM Cardiac Stress Test Nuclear Imaging   Final Result      CT CHEST PULMONARY EMBOLISM W CONTRAST   Final Result   No evidence of pulmonary embolism or acute pulmonary abnormality. XR CHEST (2 VW)   Final Result   No acute process. Consults:     IP CONSULT TO HOSPITALIST  IP CONSULT TO CARDIOLOGY  IP CONSULT TO PHARMACY    Disposition:  home    Condition at Discharge: Stable    Discharge Instructions/Follow-up:  See your PCP   Follow up with routine appointments with Dr Cordero Dates    Code Status:  Full Code     Activity: activity as tolerated    Diet: cardiac diet/diabetic      Discharge Medications:     Current Discharge Medication List           Details   nitroGLYCERIN (NITROSTAT) 0.4 MG SL tablet up to max of 3 total doses. If no relief after 1 dose, call 911.   Qty: 25 tablet, Refills: 3      pantoprazole (PROTONIX) 40 MG tablet Take 1 tablet by mouth every morning (before breakfast)  Qty: 90 tablet, Refills: 1              Details   rosuvastatin (CRESTOR) 40 MG tablet TAKE ONE TABLET BY MOUTH DAILY  Qty: 90 tablet, Refills: 3      carvedilol (COREG) 25 MG tablet TAKE ONE TABLET BY MOUTH TWICE A DAY  Qty: 60 tablet, Refills: 5      BRILINTA 90 MG TABS tablet TAKE ONE TABLET BY MOUTH TWICE A DAY  Qty: 180 tablet, Refills: 2      hydroCHLOROthiazide (MICROZIDE) 12.5 MG capsule Take 1 capsule by mouth every morning  Qty: 30 capsule, Refills: 5    Associated Diagnoses: Essential hypertension      fluticasone (FLONASE) 50 MCG/ACT nasal spray 1 spray by Each Nostril route daily  Qty: 2 Bottle, Refills: 1      gabapentin (NEURONTIN) 300 MG capsule Take 300 mg by mouth as needed. 300 mg in AM and 600 mg at Bedtime       losartan (COZAAR) 100 MG tablet Take 100 mg by mouth daily       Multiple Vitamins-Minerals (THERAPEUTIC MULTIVITAMIN-MINERALS) tablet Take 1 tablet by mouth daily      aspirin 81 MG chewable tablet Take 81 mg by mouth daily      metFORMIN (GLUCOPHAGE) 500 MG tablet Take 1 tablet by mouth 2 times daily (with meals). Qty: 180 tablet, Refills: 3             Time Spent on discharge is more than 42 min in the examination, evaluation, counseling and review of medications and discharge plan. Signed:    Dino Frank MD   2/7/2022      Thank you Dana Thurston MD for the opportunity to be involved in this patient's care. If you have any questions or concerns please feel free to contact me at [phone].

## 2022-02-07 NOTE — PLAN OF CARE
Problem: Pain:  Goal: Pain level will decrease  Description: Pain level will decrease  2/7/2022 1048 by Katie Manrique RN  Outcome: Ongoing     Problem: Pain:  Goal: Control of acute pain  Description: Control of acute pain  2/7/2022 1048 by Katie Manrique RN  Outcome: Ongoing     Problem: Pain:  Goal: Control of chronic pain  Description: Control of chronic pain  2/7/2022 1048 by Katie Manrique RN  Outcome: Ongoing     Problem: Falls - Risk of:  Goal: Will remain free from falls  Description: Will remain free from falls  2/7/2022 1048 by Katie Manrique RN  Outcome: Ongoing     Problem: Falls - Risk of:  Goal: Absence of physical injury  Description: Absence of physical injury  2/7/2022 1048 by Katie Manrique RN  Outcome: Ongoing

## 2022-02-07 NOTE — PROGRESS NOTES
Patient is back from stress test.  Patient is ok to take all of her morning medications and she may eat per Dr. Russell Mulligan. See new diet order.   Electronically signed by Herberth Mckenzie RN on 2/7/2022

## 2022-02-07 NOTE — PROGRESS NOTES
Stress test completed  No angina during exercise portion of stress  No reversible ischemia  EKG on admit no ischemia  Neg trops    Non cardiac cp  ? GI related costo chondritis etc  Can see pcp for further workup  Reassured pt    Stable for dc    Dc summary to follow    Electronically signed by Max Harvey MD on 2/7/2022 at 2:43 PM

## 2022-02-07 NOTE — PLAN OF CARE
Problem: Pain:  Goal: Pain level will decrease  Description: Pain level will decrease  2/7/2022 1730 by Mariah Alexandre RN  Outcome: Completed     Problem: Pain:  Goal: Control of acute pain  Description: Control of acute pain  2/7/2022 1730 by Mariah Alexandre RN  Outcome: Completed     Problem: Pain:  Goal: Control of chronic pain  Description: Control of chronic pain  2/7/2022 1730 by Mariah Alexandre RN  Outcome: Completed     Problem: Falls - Risk of:  Goal: Will remain free from falls  Description: Will remain free from falls  2/7/2022 1730 by Mariah Alexandre RN  Outcome: Completed     Problem: Falls - Risk of:  Goal: Absence of physical injury  Description: Absence of physical injury  2/7/2022 1730 by Mariah Alexandre RN  Outcome: Completed     Problem: Nutrition  Goal: Optimal nutrition therapy  2/7/2022 1730 by Mariah Alexandre RN  Outcome: Completed

## 2022-02-07 NOTE — PLAN OF CARE
Nutrition Problem #1: Inadequate oral intake  Intervention: Food and/or Nutrient Delivery: Continue Current Diet  Nutritional Goals: intakes >50%      Problem: Nutrition  Goal: Optimal nutrition therapy  Outcome: Ongoing

## 2022-02-07 NOTE — PROGRESS NOTES
Pt discharged to home. Patient walked off the unit under her own power. Accompanied by daughter. Transported in personal vehicle. Discharge instructions and personal belongings given to pt. Explanation of discharge medications and instructions understood by verbal statement. No questions, comments or concerns at this time.  Electronically signed by Radames Garza RN on 2/7/2022

## 2022-02-07 NOTE — PROGRESS NOTES
Patient is resting in bed. Daughter at bedside. Alert and oriented X4. Denies pain at this time. IV in place. Assessment complete. Patient educated on being NPO at midnight and verbalizes an understanding. All patient needs are met at this time. Fall precautions are in place. Call light is in reach. Will continue to monitor.    Electronically signed by Jackeline Bower RN on 2/6/2022 at 10:09 PM

## 2022-02-07 NOTE — PLAN OF CARE
Problem: Pain:  Goal: Pain level will decrease  Description: Pain level will decrease  Outcome: Ongoing  Goal: Control of acute pain  Description: Control of acute pain  Outcome: Ongoing  Note: Pt assessed for pain. Pt in pain and assessed with 0-10 pain rating scale. Pt given prescribed analgesic for pain. (See eMar) Pt satisfied with pain relief thus far. Will reassess and continue to monitor. Goal: Control of chronic pain  Description: Control of chronic pain  Outcome: Ongoing     Problem: Falls - Risk of:  Goal: Will remain free from falls  Description: Will remain free from falls  Outcome: Ongoing  Note: Fall risk assessment completed. Fall precautions in place. Call light within reach. Pt educated on calling for assistance before getting up. Walkway free of clutter. Will continue to monitor. Goal: Absence of physical injury  Description: Absence of physical injury  Outcome: Ongoing  Note: Pt is free of injury. No injury noted. Fall precautions in place. Call light within reach. Will monitor.       Electronically signed by Inés Pereira RN on 2/6/2022 at 10:08 PM

## 2022-02-07 NOTE — PROGRESS NOTES
Occupational Therapy    02/07/22    Linda Rued  1960  [id number]    OT orders received. Patient chart reviewed. Spoke with RN. Pt up ad arlette and independent with ADL and fxl mobility within hospital room. Will sign off.      Electronically signed by NAPOLEON Weston, OTR/L on 2/7/2022 at 7:48 AM

## 2022-02-07 NOTE — CARE COORDINATION
INITIAL CASE MANAGEMENT ASSESSMENT    Reviewed chart, met with patient to assess possible discharge needs. Explained Case Management role/services. Living Situation: Patient lives alone and daughter will assist as needed. ADLs: independent      DME: none    PT/OT Recs: n/a     Active Services: none     Transportation: active      Medications: takes on her own; Mileykrish Hernandez     PCP: Niles Soto MD    PLAN/COMMENTS: Patient plans to return home with her daughter for a few days. She would like a [facility] PCP list--provided. Denied other needs at this time. RN aware. SW/CM will follow and assist as needed.      Electronically signed by EMANUEL Krueger, BLAS, Case Management on 2/7/2022 at 3:11 PM  [place] [phone]

## 2022-02-07 NOTE — PROGRESS NOTES
Patient A&O. Patient is currently NPO. Patient denies pain at this time. Call light within reach. Will continue to monitor and reassess.  Electronically signed by Mak Muñiz RN on 2/7/2022

## 2022-02-07 NOTE — PROGRESS NOTES
Comprehensive Nutrition Assessment    Type and Reason for Visit:  Positive Nutrition Screen    Nutrition Recommendations/Plan:   Continue diet recommended by MD     Nutrition Assessment:  + nutrition screen MST-2. Pt admitted to hospital for chest pain. Currently NPO for stress test. She has a PMH of CAD, DM, HTN, HLD. At home she follows a low CHO diet reporting that her appetite is so/so; based on the EMR her intakes are between 26-50%. States 20# weight loss but unable to confirm this on EMR. Her CBW is 171# and her UBW is 167#. She reports not liking any ONS d/t the chalky protein taste. Malnutrition Assessment:  Malnutrition Status: At risk for malnutrition     Context:  Chronic Illness     Findings of the 6 clinical characteristics of malnutrition:  Energy Intake:  Mild decrease in energy intake (Comment) (low appetite for years now)  Weight Loss:  No significant weight loss     Body Fat Loss:  No significant body fat loss     Muscle Mass Loss:  No significant muscle mass loss    Fluid Accumulation:  Unable to assess     Strength:  Not Performed    Estimated Daily Nutrient Needs:  Energy (kcal):  7161-5534 (20-25 kcal/kg); Weight Used for Energy Requirements:  Current  Protein (g):  57-68(1-1.2 g/day); Weight Used for Protein Requirements:  Ideal        Fluid (ml/day):  5154-8199 mL;    Nutrition Related Findings:  Labs reviewed. Last BM reported 2/4. Wounds:  None         Anthropometric Measures:  · Height: 5' 5\" (165.1 cm)  · Current Body Weight: 171 lb (77.6 kg)   · Admission Body Weight: 169 lb (76.7 kg)    · Usual Body Weight: 167 lb (75.8 kg)     · Ideal Body Weight: 125 lbs; % Ideal Body Weight     · BMI: 28.5  · BMI Categories: Overweight (BMI 25.0-29. 9)       Nutrition Diagnosis:   · Inadequate oral intake related to inadequate protein-energy intake as evidenced by poor intake prior to admission      Nutrition Interventions:   Food and/or Nutrient Delivery:  Continue Current Diet  Nutrition Education/Counseling:  No recommendation at this time   Coordination of Nutrition Care:  No recommendation at this time    Goals:  intakes >50%       Nutrition Monitoring and Evaluation:   Behavioral-Environmental Outcomes:  None Identified   Food/Nutrient Intake Outcomes:  None Identified  Physical Signs/Symptoms Outcomes:  Biochemical Data,Nutrition Focused Physical Findings,Skin,Weight     Discharge Planning:     Too soon to determine     Electronically signed by Devin Cedeno on 2/7/22 at 11:15 AM EST    Contact: [phone]

## 2022-08-23 RX ORDER — CARVEDILOL 25 MG/1
TABLET ORAL
Qty: 60 TABLET | Refills: 3 | Status: SHIPPED | OUTPATIENT
Start: 2022-08-23

## 2024-05-21 NOTE — PROCEDURES
mid lesion. The patient was given IV unfractionated heparin. ACTs were checked  throughout the case to maintain an ACT greater than 250 seconds. Over the 0.035 J-wire, the 5-[place] hockey stick guide was advanced to  the ostium of the right coronary artery. The artery was wired with a  [place]ater 0.014 coronary wire. The lesion was then predilated with a 2.5  x 25-mm balloon. Balloon was removed and repeat angiography was  performed. The decision was made to stent the lesion with a 3.5 x 38-mm  Xience Nell drug-eluting stent. This was deployed to approximately  3.7 mm in diameter. We then covered the more proximal segment with an  overlapping 3.5 x 15-mm Xience Nell drug-eluting stent. This was also  deployed to 3.7 mm in diameter. We then post dilated with a 3.75-mm  balloon to a diameter of 3.91 mm throughout the entire length of the  stent. The noncompliant balloon was removed and intraarterial  nitroglycerin was given. Repeat angiography demonstrated DAVID 3 flow in  the vessel with 0% residual stenosis. Wire was removed. Final  angiography was performed. The guide catheter was removed over the  J-wire. Post cocktail of verapamil and nitroglycerin was given through the right  radial sheath port. The right radial sheath was removed and a Terumo  pressure band applied for nonocclusive hemostasis. There were no  complications from the procedure and estimated blood loss was less than  25 mL. Moderate sedation was performed for the procedure. The patient had an  ASA grade of II and a Mallampati score of II. Total duration of  sedation was 111 minutes. The patient received 2 mg of IV Versed and  100 mcg of fentanyl. Vital signs were monitored throughout the  sedation. They remained stable. There were no complications from  sedation. FINDINGS:  1. Right-dominant coronary arterial system with a 95% proximal to mid  RCA lesion with DAVID 2 flow.   This was intervened upon with
Repeat  section